# Patient Record
Sex: FEMALE | Race: BLACK OR AFRICAN AMERICAN | URBAN - METROPOLITAN AREA
[De-identification: names, ages, dates, MRNs, and addresses within clinical notes are randomized per-mention and may not be internally consistent; named-entity substitution may affect disease eponyms.]

---

## 2020-03-05 ENCOUNTER — OP HISTORICAL/CONVERTED ENCOUNTER (OUTPATIENT)
Dept: OTHER | Age: 60
End: 2020-03-05

## 2020-08-06 ENCOUNTER — ED HISTORICAL/CONVERTED ENCOUNTER (OUTPATIENT)
Dept: OTHER | Age: 60
End: 2020-08-06

## 2021-05-27 ENCOUNTER — OFFICE VISIT (OUTPATIENT)
Dept: CARDIOLOGY CLINIC | Age: 61
End: 2021-05-27
Payer: MEDICAID

## 2021-05-27 VITALS
SYSTOLIC BLOOD PRESSURE: 132 MMHG | BODY MASS INDEX: 37.73 KG/M2 | OXYGEN SATURATION: 99 % | WEIGHT: 221 LBS | HEIGHT: 64 IN | DIASTOLIC BLOOD PRESSURE: 84 MMHG | HEART RATE: 83 BPM

## 2021-05-27 DIAGNOSIS — R07.9 CHEST PAIN, UNSPECIFIED TYPE: Primary | ICD-10-CM

## 2021-05-27 DIAGNOSIS — M79.606 PAIN OF LOWER EXTREMITY, UNSPECIFIED LATERALITY: ICD-10-CM

## 2021-05-27 DIAGNOSIS — R53.83 FATIGUE, UNSPECIFIED TYPE: ICD-10-CM

## 2021-05-27 PROCEDURE — 93000 ELECTROCARDIOGRAM COMPLETE: CPT | Performed by: SPECIALIST

## 2021-05-27 PROCEDURE — 99204 OFFICE O/P NEW MOD 45 MIN: CPT | Performed by: SPECIALIST

## 2021-05-27 RX ORDER — ERGOCALCIFEROL 1.25 MG/1
CAPSULE ORAL
COMMUNITY
Start: 2021-05-05

## 2021-05-27 RX ORDER — OXCARBAZEPINE 150 MG/1
450 TABLET, FILM COATED ORAL 2 TIMES DAILY
COMMUNITY
Start: 2021-05-26

## 2021-05-27 RX ORDER — BUPROPION HYDROCHLORIDE 150 MG/1
TABLET, EXTENDED RELEASE ORAL
Qty: 60 TABLET | Refills: 2 | Status: SHIPPED | OUTPATIENT
Start: 2021-05-27 | End: 2022-08-18

## 2021-05-27 NOTE — PROGRESS NOTES
Jimmy Madison MD. [facility]              Patient: Chris Vega  : 1960      Today's Date: 2021          HISTORY OF PRESENT ILLNESS:     History of Present Illness:  She wants to get checked out. Has leg pain in the Am. Has some shoulder and neck pain that persists despite cortisone shot. Sometimes gets chest pain randomly. She has a FH of heart disease and worried about that. Smokes. Wakes up tired. PAST MEDICAL HISTORY:     Past Medical History:   Diagnosis Date    Arthritis     Family history of ischemic heart disease     Smoker     Trigeminal neuralgia            MEDICATIONS:     Current Outpatient Medications   Medication Sig Dispense Refill    OXcarbazepine (TRILEPTAL) 150 mg tablet Take 450 mg by mouth two (2) times a day.  ergocalciferol (ERGOCALCIFEROL) 1,250 mcg (50,000 unit) capsule          Allergies   Allergen Reactions    Tramadol Itching             SOCIAL HISTORY:     Social History     Tobacco Use    Smoking status: Current Every Day Smoker     Packs/day: 0.25    Smokeless tobacco: Never Used   Vaping Use    Vaping Use: Never used   Substance Use Topics    Alcohol use: Never    Drug use: Never         FAMILY HISTORY:     Family History   Problem Relation Age of Onset    Heart Disease Mother     Heart Attack Father      A      REVIEW OF SYMPTOMS:     Review of Symptoms:  Constitutional: Negative for fever, chills  HEENT: Negative for nosebleeds, tinnitus, and vision changes. Respiratory: Negative for cough, wheezing  Cardiovascular: Negative for orthopnea, claudication, syncope, and PND. Gastrointestinal: Negative for abdominal pain, diarrhea, melena. Genitourinary: Negative for dysuria  Musculoskeletal:   + joint pain  Skin: Negative for rash  Heme: No problems bleeding. Neurological: Negative for speech change and focal weakness.          PHYSICAL EXAM:     Physical Exam:  Visit Vitals  /84 (BP 1 Location: Left arm, BP Patient Position: Sitting, BP Cuff Size: Large adult)   Pulse 83   Ht 5' 4\" (1.626 m)   Wt 221 lb (100.2 kg)   SpO2 99%   BMI 37.93 kg/m²     Patient appears generally well, mood and affect are appropriate and pleasant. HEENT:  Hearing intact, non-icteric, normocephalic, atraumatic. Neck Exam: Supple, No JVD or carotid bruits. Lung Exam: Clear to auscultation, even breath sounds. Cardiac Exam: Regular rate and rhythm with no murmur or rub  Abdomen: Soft, non-tender, normal bowel sounds. No bruits or masses. Extremities: Moves all ext well. No lower extremity edema. MSKTL: Overall good ROM ext  Skin: No significant rashes  Vascular: 2+ dorsalis pedis pulses bilaterally. Psych: Appropriate affect  Neuro - Grossly intact      LABS / OTHER STUDIES reviewed:     Labs 5/4/21 - CMP OK, CBC OK. Chol 164, HDL 47, LDL 95, VIt D 15      CARDIAC DIAGNOSTICS:     Cardiac Evaluation Includes:  I reviewed the results below. EKG 5/27/21 - NSR, normal       ASSESSMENT AND PLAN:     Assessment and Plan:    1) Atypical Chest pain and FH of ischemic heart disease   - check a treadmill stress test and echo    - Needs aggressive risk reduction (such as smoking cessation) given elevated cardiac risk   - consider a CT heart scan     2) Tired   - refer for sleep study   - check TSH     3) Smoker  - she felt worse on nicotine patch   - did well on Wellbutrin before so will try that -- wrote Rx    4) Leg pain   - check LE PVR's. 5) See NP in 1 month   Has 1 daughter. Lives with mom. Mitali Roman MD, [address], [facility], [address] [place] [address]  Ph: [phone]   Ph [phone]        ADDENDUM   6/30/2021  Echo 6/29/21 - LVEF 60%    Treadmill stress test 6/29/21 -   Borderline ischemic EKG changes.  There is a 1 mm upsloping ST depression in inferolateral leads at peak exercise. During recovery she had horizontal 1 mm ST depression in the inferior leads. Stress test: Moderate risk Duke treadmill score. Exercise stress test: EKG stress test results correlate with an intermediate risk of inducible myocardial ischemia. LE PVR's with exercise -  Right:  There is evidence of significant inflow disease (I.e. aorta iliac) within the right lower extremity. JOSE LUIS's are normal at rest (1.15) and moderately reduced with exercise (0.63). Left:  There is evidence of significant inflow disease. Abnormalities are likely at the aorta iliac and femoral artery levels. ABIs are normal at rest (0.99) and severely reduced with exercise (0.50). No ischemia and normal LVEF. Has LE PAD. Needs to quit smoking. Will call. She sees NP in 1 week. ADDENDUM   7/1/2021  I called with results. Regarding borderline abnormal EKG changes on stress test, will check an exercise cardiolite. Regarding PAD, asked her to quit cigs and keep walking. Asked her to stay hydrated for resting cramps. Will move back NP appt a couple of months down the road. ADDENDUM   8/2/2021  Lexiscan Cardiolite 7/29/21 - Normal MPI. LVEF 61%    Will have nurse call with normal results. Likely no obstructive CAD.

## 2021-05-27 NOTE — PROGRESS NOTES
Identified pt with two pt identifiers(name and ). Reviewed record in preparation for visit and have obtained necessary documentation. Chief Complaint   Patient presents with    New Patient     referred by her PCP Dinorah Samson, pt has a family hx of heart problems so she wanted to get checked out. Vitals:    21 1056   BP: 132/84   Pulse: 83   SpO2: 99%   Weight: 221 lb (100.2 kg)   Height: 5' 4\" (1.626 m)   PainSc:   0 - No pain       Health Maintenance Due   Topic    Hepatitis C Screening     COVID-19 Vaccine (1)    DTaP/Tdap/Td series (1 - Tdap)    PAP AKA CERVICAL CYTOLOGY     Lipid Screen     Shingrix Vaccine Age 49> (1 of 2)    Colorectal Cancer Screening Combo     Breast Cancer Screen Mammogram        Coordination of Care Questionnaire:  :   1) Have you been to an emergency room, urgent care, or hospitalized since your last visit? If yes, where when, and reason for visit? No      2. Have seen or consulted any other health care provider since your last visit? If yes, where when, and reason for visit?   No

## 2021-05-27 NOTE — PROGRESS NOTES
Orders for Check echo, treadmill stress test, LE PVR's when possible; sleep study ref. See Carlos Romero in a month per Dr. Kanika TORIBIO.   Dx: cp, fh heart dz

## 2021-06-06 LAB — TSH SERPL DL<=0.005 MIU/L-ACNC: 1.03 UIU/ML (ref 0.45–4.5)

## 2021-06-29 ENCOUNTER — ANCILLARY PROCEDURE (OUTPATIENT)
Dept: CARDIOLOGY CLINIC | Age: 61
End: 2021-06-29

## 2021-06-29 ENCOUNTER — ANCILLARY PROCEDURE (OUTPATIENT)
Dept: CARDIOLOGY CLINIC | Age: 61
End: 2021-06-29
Payer: MEDICAID

## 2021-06-29 VITALS
HEIGHT: 64 IN | BODY MASS INDEX: 37.73 KG/M2 | WEIGHT: 221 LBS | SYSTOLIC BLOOD PRESSURE: 130 MMHG | DIASTOLIC BLOOD PRESSURE: 78 MMHG

## 2021-06-29 VITALS — HEIGHT: 64 IN | BODY MASS INDEX: 37.73 KG/M2 | WEIGHT: 221 LBS

## 2021-06-29 VITALS — BODY MASS INDEX: 37.73 KG/M2 | HEIGHT: 64 IN | WEIGHT: 221 LBS

## 2021-06-29 DIAGNOSIS — R07.9 CHEST PAIN, UNSPECIFIED TYPE: ICD-10-CM

## 2021-06-29 DIAGNOSIS — I73.9 CLAUDICATION IN PERIPHERAL VASCULAR DISEASE (HCC): ICD-10-CM

## 2021-06-29 DIAGNOSIS — R53.83 FATIGUE, UNSPECIFIED TYPE: ICD-10-CM

## 2021-06-29 DIAGNOSIS — M79.606 PAIN OF LOWER EXTREMITY, UNSPECIFIED LATERALITY: ICD-10-CM

## 2021-06-29 LAB
ECHO AO ROOT DIAM: 2.95 CM
ECHO AV AREA PEAK VELOCITY: 1.88 CM2
ECHO AV AREA VTI: 2.04 CM2
ECHO AV AREA/BSA PEAK VELOCITY: 0.9 CM2/M2
ECHO AV AREA/BSA VTI: 1 CM2/M2
ECHO AV MEAN GRADIENT: 3.57 MMHG
ECHO AV PEAK GRADIENT: 6.5 MMHG
ECHO AV PEAK VELOCITY: 127.49 CM/S
ECHO AV VTI: 26.99 CM
ECHO LA AREA 4C: 15.24 CM2
ECHO LA MAJOR AXIS: 3.45 CM
ECHO LA MINOR AXIS: 1.69 CM
ECHO LA VOL 2C: 49.49 ML (ref 22–52)
ECHO LA VOL 4C: 36.22 ML (ref 22–52)
ECHO LA VOL BP: 47.64 ML (ref 22–52)
ECHO LA VOL/BSA BIPLANE: 23.35 ML/M2 (ref 16–28)
ECHO LA VOLUME INDEX A2C: 24.26 ML/M2 (ref 16–28)
ECHO LA VOLUME INDEX A4C: 17.75 ML/M2 (ref 16–28)
ECHO LV E' LATERAL VELOCITY: 7.37 CM/S
ECHO LV E' SEPTAL VELOCITY: 5.65 CM/S
ECHO LV EDV A2C: 94.89 ML
ECHO LV EDV A4C: 123.99 ML
ECHO LV EDV BP: 108.49 ML (ref 56–104)
ECHO LV EDV INDEX A4C: 60.8 ML/M2
ECHO LV EDV INDEX BP: 53.2 ML/M2
ECHO LV EDV NDEX A2C: 46.5 ML/M2
ECHO LV EJECTION FRACTION A2C: 61 PERCENT
ECHO LV EJECTION FRACTION A4C: 67 PERCENT
ECHO LV EJECTION FRACTION BIPLANE: 62.2 PERCENT (ref 55–100)
ECHO LV ESV A2C: 36.99 ML
ECHO LV ESV A4C: 40.63 ML
ECHO LV ESV BP: 40.96 ML (ref 19–49)
ECHO LV ESV INDEX A2C: 18.1 ML/M2
ECHO LV ESV INDEX A4C: 19.9 ML/M2
ECHO LV ESV INDEX BP: 20.1 ML/M2
ECHO LV INTERNAL DIMENSION DIASTOLIC: 4.68 CM (ref 3.9–5.3)
ECHO LV INTERNAL DIMENSION SYSTOLIC: 3.28 CM
ECHO LV IVSD: 0.91 CM (ref 0.6–0.9)
ECHO LV MASS 2D: 138.6 G (ref 67–162)
ECHO LV MASS INDEX 2D: 67.9 G/M2 (ref 43–95)
ECHO LV POSTERIOR WALL DIASTOLIC: 0.86 CM (ref 0.6–0.9)
ECHO LVOT DIAM: 1.93 CM
ECHO LVOT PEAK GRADIENT: 2.66 MMHG
ECHO LVOT PEAK VELOCITY: 81.48 CM/S
ECHO LVOT SV: 55 ML
ECHO LVOT VTI: 18.73 CM
ECHO MV A VELOCITY: 102.12 CM/S
ECHO MV AREA PHT: 5.18 CM2
ECHO MV E DECELERATION TIME (DT): 146.46 MS
ECHO MV E VELOCITY: 53.54 CM/S
ECHO MV E/A RATIO: 0.52
ECHO MV E/E' LATERAL: 7.26
ECHO MV E/E' RATIO (AVERAGED): 8.37
ECHO MV E/E' SEPTAL: 9.48
ECHO MV PRESSURE HALF TIME (PHT): 42.47 MS
ECHO RA AREA 4C: 11.15 CM2
ECHO RV INTERNAL DIMENSION: 3.17 CM
ECHO RV TAPSE: 1.96 CM (ref 1.5–2)
ECHO TV REGURGITANT MAX VELOCITY: 257.57 CM/S
ECHO TV REGURGITANT PEAK GRADIENT: 26.54 MMHG
IMMEDIATE ARM BP: 149 MMHG
IMMEDIATE LEFT ABI: 0.5
IMMEDIATE LEFT TIBIAL: 75 MMHG
IMMEDIATE RIGHT ABI: 0.63
IMMEDIATE RIGHT TIBIAL: 94 MMHG
LA VOL DISK BP: 42.92 ML (ref 22–52)
LEFT ABI: 0.99
LEFT ANTERIOR TIBIAL: 153 MMHG
LEFT ARM BP: 155 MMHG
LEFT CALF PRESSURE: 148 MMHG
LEFT HIGH THIGH PRESSURE: 229 MMHG
LEFT LOW THIGH PRESSURE: 195 MMHG
LEFT POSTERIOR TIBIAL: 153 MMHG
POST1 ARM BP: 145 MMHG
POST1 LEFT ABI: 0.59
POST1 LEFT TIBIAL: 85 MMHG
POST1 RIGHT ABI: 1.05
POST1 RIGHT TIBIAL: 152 MMHG
RIGHT ABI: 1.15
RIGHT ANTERIOR TIBIAL: 167 MMHG
RIGHT ARM BP: 148 MMHG
RIGHT CALF PRESSURE: 182 MMHG
RIGHT HIGH THIGH PRESSURE: 214 MMHG
RIGHT LOW THIGH PRESSURE: 209 MMHG
RIGHT POSTERIOR TIBIAL: 179 MMHG
STRESS ANGINA INDEX: 0
STRESS BASELINE DIAS BP: 86 MMHG
STRESS BASELINE HR: 80 BPM
STRESS BASELINE SYS BP: 134 MMHG
STRESS ESTIMATED WORKLOAD: 7 METS
STRESS EXERCISE DUR MIN: NORMAL
STRESS O2 SAT PEAK: 99 %
STRESS O2 SAT REST: 100 %
STRESS PEAK DIAS BP: 96 MMHG
STRESS PEAK SYS BP: 200 MMHG
STRESS PERCENT HR ACHIEVED: 94 %
STRESS POST PEAK HR: 150 BPM
STRESS RATE PRESSURE PRODUCT: NORMAL BPM*MMHG
STRESS ST DEPRESSION: 1 MM
STRESS TARGET HR: 159 BPM

## 2021-06-29 PROCEDURE — 93306 TTE W/DOPPLER COMPLETE: CPT | Performed by: SPECIALIST

## 2021-06-29 PROCEDURE — 93924 LWR XTR VASC STDY BILAT: CPT | Performed by: SPECIALIST

## 2021-06-29 PROCEDURE — 93015 CV STRESS TEST SUPVJ I&R: CPT | Performed by: SPECIALIST

## 2021-07-02 ENCOUNTER — TELEPHONE (OUTPATIENT)
Dept: CARDIOLOGY CLINIC | Age: 61
End: 2021-07-02

## 2021-07-02 DIAGNOSIS — R07.9 CHEST PAIN, UNSPECIFIED TYPE: Primary | ICD-10-CM

## 2021-07-02 DIAGNOSIS — R53.83 FATIGUE, UNSPECIFIED TYPE: ICD-10-CM

## 2021-07-02 NOTE — TELEPHONE ENCOUNTER
Called pt, LVM  Per Dr. Kerri Hermosillo: Marlenniharika Lopez - can you please   1) set her up for an exercise cardiolite   2) Cancel NP visit for next month and instead have her come back in 2 months for FU visit     Cathi Pollock - can you please call her with exercise cardiolite instructions. \"    Unable to speak with pt, so I left the OV on. Have the instructions on JOSÉ LUIS Rutherford's desk to give pt Tue 9am, schedule before leaving office.

## 2021-07-29 ENCOUNTER — ANCILLARY PROCEDURE (OUTPATIENT)
Dept: CARDIOLOGY CLINIC | Age: 61
End: 2021-07-29
Payer: MEDICAID

## 2021-07-29 VITALS — HEIGHT: 64 IN | WEIGHT: 221 LBS | BODY MASS INDEX: 37.73 KG/M2

## 2021-07-29 DIAGNOSIS — R53.83 FATIGUE, UNSPECIFIED TYPE: ICD-10-CM

## 2021-07-29 DIAGNOSIS — R07.9 CHEST PAIN, UNSPECIFIED TYPE: ICD-10-CM

## 2021-07-29 PROCEDURE — 93015 CV STRESS TEST SUPVJ I&R: CPT | Performed by: SPECIALIST

## 2021-07-29 PROCEDURE — 78452 HT MUSCLE IMAGE SPECT MULT: CPT | Performed by: SPECIALIST

## 2021-07-29 PROCEDURE — A9500 TC99M SESTAMIBI: HCPCS | Performed by: SPECIALIST

## 2021-07-29 RX ORDER — TETRAKIS(2-METHOXYISOBUTYLISOCYANIDE)COPPER(I) TETRAFLUOROBORATE 1 MG/ML
25.6 INJECTION, POWDER, LYOPHILIZED, FOR SOLUTION INTRAVENOUS ONCE
Status: COMPLETED | OUTPATIENT
Start: 2021-07-29 | End: 2021-07-29

## 2021-07-29 RX ORDER — TETRAKIS(2-METHOXYISOBUTYLISOCYANIDE)COPPER(I) TETRAFLUOROBORATE 1 MG/ML
8.4 INJECTION, POWDER, LYOPHILIZED, FOR SOLUTION INTRAVENOUS ONCE
Status: COMPLETED | OUTPATIENT
Start: 2021-07-29 | End: 2021-07-29

## 2021-07-29 RX ADMIN — TETRAKIS(2-METHOXYISOBUTYLISOCYANIDE)COPPER(I) TETRAFLUOROBORATE 25.6 MILLICURIE: 1 INJECTION, POWDER, LYOPHILIZED, FOR SOLUTION INTRAVENOUS at 11:42

## 2021-07-29 RX ADMIN — TETRAKIS(2-METHOXYISOBUTYLISOCYANIDE)COPPER(I) TETRAFLUOROBORATE 8.4 MILLICURIE: 1 INJECTION, POWDER, LYOPHILIZED, FOR SOLUTION INTRAVENOUS at 10:20

## 2021-07-31 LAB
STRESS BASELINE DIAS BP: 78 MMHG
STRESS BASELINE HR: 71 BPM
STRESS BASELINE SYS BP: 142 MMHG
STRESS ESTIMATED WORKLOAD: 1 METS
STRESS EXERCISE DUR MIN: NORMAL
STRESS O2 SAT PEAK: 100 %
STRESS O2 SAT REST: 99 %
STRESS PEAK DIAS BP: 78 MMHG
STRESS PEAK SYS BP: 140 MMHG
STRESS PERCENT HR ACHIEVED: 71 %
STRESS POST PEAK HR: 113 BPM
STRESS RATE PRESSURE PRODUCT: NORMAL BPM*MMHG
STRESS ST DEPRESSION: 0 MM
STRESS ST ELEVATION: 0 MM
STRESS TARGET HR: 159 BPM

## 2021-08-02 ENCOUNTER — TELEPHONE (OUTPATIENT)
Dept: CARDIOLOGY CLINIC | Age: 61
End: 2021-08-02

## 2021-08-02 NOTE — TELEPHONE ENCOUNTER
Called pt,  Per Dr. Dionna Nguyễn: Davin Simpson 7/29/21 - Normal MPI.  LVEF 61%     Will have nurse call with normal results. Likely no obstructive CAD. \"  Confirmed location & timing NOV:  Future Appointments   Date Time Provider Marissa Petty   9/15/2021  1:20 PM MD MARIELA Garcia AMB

## 2022-05-11 ENCOUNTER — OFFICE VISIT (OUTPATIENT)
Dept: PODIATRY | Age: 62
End: 2022-05-11
Payer: MEDICAID

## 2022-05-11 VITALS
BODY MASS INDEX: 37.73 KG/M2 | TEMPERATURE: 96.8 F | HEART RATE: 70 BPM | HEIGHT: 64 IN | DIASTOLIC BLOOD PRESSURE: 66 MMHG | SYSTOLIC BLOOD PRESSURE: 130 MMHG | WEIGHT: 221 LBS

## 2022-05-11 DIAGNOSIS — L60.0 INGROWN TOENAIL OF RIGHT FOOT: Primary | ICD-10-CM

## 2022-05-11 DIAGNOSIS — L60.0 INGROWN TOENAIL OF LEFT FOOT: ICD-10-CM

## 2022-05-11 PROCEDURE — 99203 OFFICE O/P NEW LOW 30 MIN: CPT | Performed by: PODIATRIST

## 2022-05-11 RX ORDER — LANOLIN ALCOHOL/MO/W.PET/CERES
1000 CREAM (GRAM) TOPICAL DAILY
COMMUNITY

## 2022-05-11 RX ORDER — LOSARTAN POTASSIUM AND HYDROCHLOROTHIAZIDE 12.5; 1 MG/1; MG/1
1 TABLET ORAL DAILY
COMMUNITY

## 2022-05-11 RX ORDER — CELECOXIB 200 MG/1
200 CAPSULE ORAL 2 TIMES DAILY
COMMUNITY

## 2022-05-11 NOTE — PROGRESS NOTES
Chief Complaint   Patient presents with    Ingrown Toenail     pt states both great toes are sore     1. Have you been to the ER, urgent care clinic since your last visit? Hospitalized since your last visit? No    2. Have you seen or consulted any other health care providers outside of the [address] [place] [address] since your last visit? Include any pap smears or colon screening.  No  PCP-Dr Richard Peterson

## 2022-06-07 NOTE — PROGRESS NOTES
[facility]    Subjective:         Patient is a 58 y.o. female who is being seen as a new pt for bilateral big toe pain. Patient states she has been suffering with the pain for the past several weeks with increasing severity. States pain now is level of 8 out of 10. She states weightbearing and pressure exacerbates pain. She denies any overt trauma. She denies any recent changes in her shoe gear but admits to a decrease in activity due to the pain. She denies any overt local/systemic signs infection. She denies any other pedal complaints    Past Medical History:   Diagnosis Date    Arthritis     Family history of ischemic heart disease     Smoker     Trigeminal neuralgia      Past Surgical History:   Procedure Laterality Date    HX GASTRIC BYPASS  1991    HX HYSTERECTOMY  2001       Family History   Problem Relation Age of Onset    Heart Disease Mother     Heart Attack Father       Social History     Tobacco Use    Smoking status: Current Every Day Smoker     Packs/day: 0.25    Smokeless tobacco: Never Used   Substance Use Topics    Alcohol use: Never     Allergies   Allergen Reactions    Tramadol Itching     Prior to Admission medications    Medication Sig Start Date End Date Taking? Authorizing Provider   celecoxib (CeleBREX) 200 mg capsule Take 200 mg by mouth two (2) times a day. Yes Provider, Historical   cyanocobalamin 1,000 mcg tablet Take 1,000 mcg by mouth daily. Yes Provider, Historical   losartan-hydroCHLOROthiazide (HYZAAR) 100-12.5 mg per tablet Take 1 Tablet by mouth daily. Yes Provider, Historical   OXcarbazepine (TRILEPTAL) 150 mg tablet Take 450 mg by mouth two (2) times a day.  5/26/21  Yes Provider, Historical   ergocalciferol (ERGOCALCIFEROL) 1,250 mcg (50,000 unit) capsule  5/5/21   Provider, Historical   buPROPion SR (WELLBUTRIN SR) 150 mg SR tablet Oral: Initial: 150 mg once daily for 3 days; increase to 150 mg twice daily (maximum dose: 300 mg/day). Patient not taking: Reported on 5/11/2022 5/27/21   Kusum Neff MD       Review of Systems   Constitutional: Negative. HENT: Negative. Eyes: Negative. Respiratory: Negative. Cardiovascular: Negative. Gastrointestinal: Negative. Endocrine: Negative. Genitourinary: Negative. Musculoskeletal: Positive for arthralgias. Skin: Negative. Allergic/Immunologic: Negative. Neurological: Negative. Hematological: Negative. Psychiatric/Behavioral: Positive for dysphoric mood. All other systems reviewed and are negative. Objective:     Visit Vitals  /66 (BP 1 Location: Right upper arm, BP Patient Position: Sitting, BP Cuff Size: Large adult)   Pulse 70   Temp 96.8 °F (36 °C) (Temporal)   Ht 5' 4\" (1.626 m)   Wt 221 lb (100.2 kg)   BMI 37.93 kg/m²       Physical Exam  Vitals reviewed. Constitutional:       Appearance: She is morbidly obese. Cardiovascular:      Pulses:           Dorsalis pedis pulses are 2+ on the right side and 2+ on the left side. Posterior tibial pulses are 2+ on the right side and 2+ on the left side. Pulmonary:      Effort: Pulmonary effort is normal.   Musculoskeletal:      Right lower leg: No edema. Left lower leg: No edema. Right foot: Normal range of motion. No deformity or bunion. Left foot: Normal range of motion. No deformity or bunion. Feet:      Right foot:      Protective Sensation: 10 sites tested. 10 sites sensed. Skin integrity: Skin integrity normal.      Toenail Condition: Right toenails are ingrown. Left foot:      Protective Sensation: 10 sites tested. 10 sites sensed. Skin integrity: Skin integrity normal.      Toenail Condition: Left toenails are ingrown. Lymphadenopathy:      Lower Body: No right inguinal adenopathy. No left inguinal adenopathy. Skin:     General: Skin is warm. Capillary Refill: Capillary refill takes 2 to 3 seconds.    Neurological:      Mental Status: She is alert and oriented to person, place, and time. Psychiatric:         Mood and Affect: Mood and affect normal.         Behavior: Behavior is cooperative. Data Review: No results found for this or any previous visit (from the past 24 hour(s)). Impression:       ICD-10-CM ICD-9-CM    1. Ingrown toenail of right foot  L60.0 703.0    2. Ingrown toenail of left foot  L60.0 703.0        Recommendation:     Patient seen and evaluated in the office  Discussed and educated patient regarding her current medical condition and possible etiologies of her symptoms  A slant back procedure was performed the offending nail borders of both great toenails. Patient tolerated well. Instructed patient that if her symptoms persist, she may need a more permanent procedure. Patient verbalized understanding        William Horta.  Balwinder Sullivan, [facility], [address] [place] [address] and [facility]  [address] [place] [address]  O: [phone]  F: [phone]  C: [phone]

## 2022-08-18 ENCOUNTER — OFFICE VISIT (OUTPATIENT)
Dept: PODIATRY | Age: 62
End: 2022-08-18
Payer: MEDICAID

## 2022-08-18 VITALS
BODY MASS INDEX: 37.73 KG/M2 | DIASTOLIC BLOOD PRESSURE: 73 MMHG | WEIGHT: 221 LBS | HEART RATE: 72 BPM | TEMPERATURE: 96.8 F | HEIGHT: 64 IN | SYSTOLIC BLOOD PRESSURE: 142 MMHG

## 2022-08-18 DIAGNOSIS — G57.93 NEUROPATHY INVOLVING BOTH LOWER EXTREMITIES: ICD-10-CM

## 2022-08-18 DIAGNOSIS — L60.3 ONYCHODYSTROPHY: Primary | ICD-10-CM

## 2022-08-18 PROCEDURE — 99213 OFFICE O/P EST LOW 20 MIN: CPT | Performed by: PODIATRIST

## 2022-08-18 RX ORDER — AMITRIPTYLINE HYDROCHLORIDE 25 MG/1
25 TABLET, FILM COATED ORAL
Qty: 90 TABLET | Refills: 0 | Status: SHIPPED | OUTPATIENT
Start: 2022-08-18

## 2022-08-18 NOTE — PROGRESS NOTES
Chief Complaint   Patient presents with    Foot Exam     Pt states she needs her toenails clipped     1. Have you been to the ER, urgent care clinic since your last visit? Hospitalized since your last visit? No    2. Have you seen or consulted any other health care providers outside of the [address] [place] [address] since your last visit? Include any pap smears or colon screening.  No  PCP-Dr Wilson Person

## 2022-09-16 NOTE — PROGRESS NOTES
[facility]    Subjective:         Patient is a 58 y.o. female who is being seen as a returning patient for bilateral foot pain. Patient states that she also has thick/discolored toenails. She states the foot pain is worse in the evenings and is described as a burning/tingling. She denies any trauma. She denies any recent changes in her shoe gear or activity level. She denies any breaks in skin or systemic signs of infection. Regarding the toenails, she states she is tried multiple over-the-counter medications without relief of her symptoms. She denies any other lower extremity complaints      Past Medical History:   Diagnosis Date    Arthritis     Family history of ischemic heart disease     Smoker     Trigeminal neuralgia      Past Surgical History:   Procedure Laterality Date    HX GASTRIC BYPASS  1991    HX HYSTERECTOMY  2001       Family History   Problem Relation Age of Onset    Heart Disease Mother     Heart Attack Father       Social History     Tobacco Use    Smoking status: Every Day     Packs/day: 0.25     Types: Cigarettes    Smokeless tobacco: Never   Substance Use Topics    Alcohol use: Never     Allergies   Allergen Reactions    Tramadol Itching     Prior to Admission medications    Medication Sig Start Date End Date Taking? Authorizing Provider   amitriptyline (ELAVIL) 25 mg tablet Take 1 Tablet by mouth nightly. 8/18/22  Yes Leighton Young DPM   celecoxib (CeleBREX) 200 mg capsule Take 200 mg by mouth two (2) times a day. Provider, Historical   cyanocobalamin 1,000 mcg tablet Take 1,000 mcg by mouth daily. Provider, Historical   losartan-hydroCHLOROthiazide (HYZAAR) 100-12.5 mg per tablet Take 1 Tablet by mouth daily. Provider, Historical   OXcarbazepine (TRILEPTAL) 150 mg tablet Take 450 mg by mouth two (2) times a day.  5/26/21   Provider, Historical   ergocalciferol (ERGOCALCIFEROL) 1,250 mcg (50,000 unit) capsule  5/5/21   Provider, Historical Review of Systems   Constitutional: Negative. HENT: Negative. Eyes: Negative. Respiratory: Negative. Cardiovascular: Negative. Gastrointestinal: Negative. Endocrine: Negative. Genitourinary: Negative. Musculoskeletal:  Positive for arthralgias. Skin: Negative. Allergic/Immunologic: Negative. Neurological: Negative. Hematological: Negative. Psychiatric/Behavioral:  Positive for dysphoric mood. All other systems reviewed and are negative. Objective:     Visit Vitals  BP (!) 142/73 (BP 1 Location: Right upper arm, BP Patient Position: Sitting, BP Cuff Size: Large adult)   Pulse 72   Temp 96.8 °F (36 °C) (Temporal)   Ht 5' 4\" (1.626 m)   Wt 221 lb (100.2 kg)   BMI 37.93 kg/m²       Physical Exam  Vitals reviewed. Constitutional:       Appearance: She is morbidly obese. Cardiovascular:      Pulses:           Dorsalis pedis pulses are 2+ on the right side and 2+ on the left side. Posterior tibial pulses are 2+ on the right side and 2+ on the left side. Pulmonary:      Effort: Pulmonary effort is normal.   Musculoskeletal:      Right lower leg: No edema. Left lower leg: No edema. Right foot: Normal range of motion. No deformity or bunion. Left foot: Normal range of motion. No deformity or bunion. Feet:      Right foot:      Protective Sensation: 10 sites tested. 10 sites sensed. Skin integrity: Skin integrity normal.      Toenail Condition: Right toenails are abnormally thick. Left foot:      Protective Sensation: 10 sites tested. 10 sites sensed. Skin integrity: Skin integrity normal.      Toenail Condition: Left toenails are abnormally thick. Lymphadenopathy:      Lower Body: No right inguinal adenopathy. No left inguinal adenopathy. Skin:     General: Skin is warm. Capillary Refill: Capillary refill takes 2 to 3 seconds. Neurological:      Mental Status: She is alert and oriented to person, place, and time. Comments: Paresthesias/burning noted   Psychiatric:         Mood and Affect: Mood and affect normal.         Behavior: Behavior is cooperative. Data Review: No results found for this or any previous visit (from the past 24 hour(s)). Impression:       ICD-10-CM ICD-9-CM    1. Onychodystrophy  L60.3 703.8       2. Neuropathy involving both lower extremities  G57.93 356.9           Recommendation:     Patient seen and evaluated in the office  Discussed and educated patient regarding her current medical condition and possible etiologies of her symptoms  Encouraged patient to thin her toenails as needed for symptomatic relief. If needed, a topical emollient can be prescribed  Discussed her lower extremity neuropathic pain. A prescription was given for amitriptyline 25 mg to be taken nightly for symptomatic relief        Jr Demarco, [address] [facility], [address] [place] [address] and [facility]  [address] [place] [address]  O: [phone]  F: [phone]  C: [phone]

## 2022-11-21 ENCOUNTER — OFFICE VISIT (OUTPATIENT)
Dept: PODIATRY | Age: 62
End: 2022-11-21
Payer: MEDICAID

## 2022-11-21 VITALS
WEIGHT: 221 LBS | SYSTOLIC BLOOD PRESSURE: 143 MMHG | BODY MASS INDEX: 37.73 KG/M2 | HEIGHT: 64 IN | DIASTOLIC BLOOD PRESSURE: 70 MMHG | TEMPERATURE: 96.9 F | HEART RATE: 73 BPM

## 2022-11-21 DIAGNOSIS — G62.9 NEUROPATHY: ICD-10-CM

## 2022-11-21 DIAGNOSIS — I73.9 PAD (PERIPHERAL ARTERY DISEASE) (HCC): Primary | ICD-10-CM

## 2022-11-21 PROCEDURE — 99213 OFFICE O/P EST LOW 20 MIN: CPT | Performed by: PODIATRIST

## 2022-11-22 NOTE — PROGRESS NOTES
[facility]    Subjective:         Patient is a 58 y.o. female who is being seen as a returning patient for bilateral foot pain. She states the foot pain is worse in the evenings and is described as a burning/tingling. She states the amitriptyline 25mg PO QHS that was rx'ed last office visit made her feel funny, thus she stopped the medication. She denies any trauma. She denies any recent changes in her shoe gear or activity level. She denies any breaks in skin or systemic signs of infection. She denies any other lower extremity complaints      Past Medical History:   Diagnosis Date    Arthritis     Family history of ischemic heart disease     Smoker     Trigeminal neuralgia      Past Surgical History:   Procedure Laterality Date    HX GASTRIC BYPASS  1991    HX HYSTERECTOMY  2001       Family History   Problem Relation Age of Onset    Heart Disease Mother     Heart Attack Father       Social History     Tobacco Use    Smoking status: Every Day     Packs/day: 0.25     Types: Cigarettes    Smokeless tobacco: Never   Substance Use Topics    Alcohol use: Never     Allergies   Allergen Reactions    Tramadol Itching     Prior to Admission medications    Medication Sig Start Date End Date Taking? Authorizing Provider   amitriptyline (ELAVIL) 25 mg tablet Take 1 Tablet by mouth nightly. 8/18/22   Yesi Young, EUSEBIO   celecoxib (CeleBREX) 200 mg capsule Take 200 mg by mouth two (2) times a day. Provider, Historical   cyanocobalamin 1,000 mcg tablet Take 1,000 mcg by mouth daily. Provider, Historical   losartan-hydroCHLOROthiazide (HYZAAR) 100-12.5 mg per tablet Take 1 Tablet by mouth daily. Provider, Historical   OXcarbazepine (TRILEPTAL) 150 mg tablet Take 450 mg by mouth two (2) times a day. 5/26/21   Provider, Historical   ergocalciferol (ERGOCALCIFEROL) 1,250 mcg (50,000 unit) capsule  5/5/21   Provider, Historical       Review of Systems   Constitutional: Negative.     HENT: Negative. Eyes: Negative. Respiratory: Negative. Cardiovascular: Negative. Gastrointestinal: Negative. Endocrine: Negative. Genitourinary: Negative. Musculoskeletal:  Positive for arthralgias. Skin: Negative. Allergic/Immunologic: Negative. Neurological: Negative. Hematological: Negative. Psychiatric/Behavioral:  Positive for dysphoric mood. All other systems reviewed and are negative. Objective:     Visit Vitals  BP (!) 143/70 (BP 1 Location: Right upper arm, BP Patient Position: Sitting, BP Cuff Size: Large adult)   Pulse 73   Temp 96.9 °F (36.1 °C) (Temporal)   Ht 5' 4\" (1.626 m)   Wt 221 lb (100.2 kg)   BMI 37.93 kg/m²       Physical Exam  Vitals reviewed. Constitutional:       Appearance: She is morbidly obese. Cardiovascular:      Pulses:           Dorsalis pedis pulses are 2+ on the right side and 2+ on the left side. Posterior tibial pulses are 2+ on the right side and 2+ on the left side. Pulmonary:      Effort: Pulmonary effort is normal.   Musculoskeletal:      Right lower leg: No edema. Left lower leg: No edema. Right foot: Normal range of motion. No deformity or bunion. Left foot: Normal range of motion. No deformity or bunion. Feet:      Right foot:      Protective Sensation: 10 sites tested. 10 sites sensed. Skin integrity: Skin integrity normal.      Toenail Condition: Right toenails are abnormally thick. Left foot:      Protective Sensation: 10 sites tested. 10 sites sensed. Skin integrity: Skin integrity normal.      Toenail Condition: Left toenails are abnormally thick. Lymphadenopathy:      Lower Body: No right inguinal adenopathy. No left inguinal adenopathy. Skin:     General: Skin is warm. Capillary Refill: Capillary refill takes 2 to 3 seconds. Neurological:      Mental Status: She is alert and oriented to person, place, and time.       Comments: Paresthesias/burning noted   Psychiatric: Mood and Affect: Mood and affect normal.         Behavior: Behavior is cooperative. Data Review: No results found for this or any previous visit (from the past 24 hour(s)). Impression:       ICD-10-CM ICD-9-CM    1. PAD (peripheral artery disease) (HCC)  I73.9 443.9 LOWER EXT ART PVR MULT LEVEL SEG PRESSURES      2. Neuropathy  G62.9 355.9           Recommendation:     Patient seen and evaluated in the office  Discussed and educated patient regarding her current medical condition and possible etiologies of her symptoms  Rx given for non-invasive vascular studies to irrigate the arterial inflow of bilateral lower extremities. Once imaging has been performed, will discuss treatment options in more depth          Jr Rosales, [address] [facility], [facility] and [facility]  [address] [place] [address]  O: [phone]  F: [phone]  C: [phone]

## 2022-11-23 ENCOUNTER — HOSPITAL ENCOUNTER (OUTPATIENT)
Dept: NON INVASIVE DIAGNOSTICS | Age: 62
Discharge: HOME | End: 2022-11-23
Attending: PODIATRIST
Payer: MEDICAID

## 2022-11-23 DIAGNOSIS — I73.9 PAD (PERIPHERAL ARTERY DISEASE) (HCC): ICD-10-CM

## 2022-11-23 PROCEDURE — 93923 UPR/LXTR ART STDY 3+ LVLS: CPT

## 2022-11-24 LAB
LEFT ABI: 0.88
LEFT ARM BP: 140 MMHG
LEFT POSTERIOR TIBIAL: 130 MMHG
LEFT TBI: 0.62
LEFT TOE PRESSURE: 92 MMHG
RIGHT ABI: 0.98
RIGHT ARM BP: 148 MMHG
RIGHT POSTERIOR TIBIAL: 143 MMHG
RIGHT TBI: 0.66
RIGHT TOE PRESSURE: 98 MMHG
VAS LEFT DORSALIS PEDIS BP: 115 MMHG
VAS RIGHT DORSALIS PEDIS BP: 145 MMHG

## 2022-11-29 DIAGNOSIS — I73.9 PAD (PERIPHERAL ARTERY DISEASE) (HCC): Primary | ICD-10-CM

## 2022-12-19 ENCOUNTER — OFFICE VISIT (OUTPATIENT)
Dept: SURGERY | Age: 62
End: 2022-12-19
Payer: MEDICAID

## 2022-12-19 VITALS
DIASTOLIC BLOOD PRESSURE: 67 MMHG | BODY MASS INDEX: 35 KG/M2 | SYSTOLIC BLOOD PRESSURE: 134 MMHG | TEMPERATURE: 97.1 F | OXYGEN SATURATION: 93 % | WEIGHT: 205 LBS | HEART RATE: 77 BPM | HEIGHT: 64 IN

## 2022-12-19 DIAGNOSIS — I73.9 PERIPHERAL VASCULAR DISEASE (HCC): Primary | ICD-10-CM

## 2022-12-19 PROCEDURE — 99203 OFFICE O/P NEW LOW 30 MIN: CPT | Performed by: SURGERY

## 2022-12-19 RX ORDER — DICLOFENAC SODIUM 75 MG/1
TABLET, DELAYED RELEASE ORAL
COMMUNITY
Start: 2022-11-18

## 2022-12-19 RX ORDER — HYDROCHLOROTHIAZIDE 25 MG/1
25 TABLET ORAL DAILY
COMMUNITY
Start: 2022-11-18

## 2022-12-19 NOTE — PROGRESS NOTES
Identified pt with two pt identifiers (name and ). Reviewed chart in preparation for visit and have obtained necessary documentation. Leora Barrios is a 58 y.o. female  Chief Complaint   Patient presents with    New Patient     Consultation-PAD     Visit Vitals  /67 (BP 1 Location: Left upper arm, BP Patient Position: Sitting, BP Cuff Size: Large adult)   Pulse 77   Temp 97.1 °F (36.2 °C) (Temporal)   Ht 5' 4\" (1.626 m)   Wt 205 lb (93 kg)   SpO2 93%   BMI 35.19 kg/m²       1. Have you been to the ER, urgent care clinic since your last visit? Hospitalized since your last visit? No    2. Have you seen or consulted any other health care providers outside of the [address] [place] [address] since your last visit? Include any pap smears or colon screening.  No

## 2022-12-27 PROBLEM — I73.9 PERIPHERAL VASCULAR DISEASE (HCC): Status: ACTIVE | Noted: 2022-12-27

## 2022-12-27 NOTE — PROGRESS NOTES
Vascular History and Physical    Patient: Ghada Hoang  MRN: [id number]    YOB: 1960  Age: 58 y.o. Sex: female     Chief Complaint:  Chief Complaint   Patient presents with    New Patient     Consultation-PAD       History of Present Illness: Ghada Hoang is a 58 y.o. very pleasant woman is here today for vascular evaluation. Patient has had both leg numbness and pain for some time now. Patient was told she has PAD. And has failed conservative management for PAD. Recent JOSE LUIS examination shows right ankle index 0.98 and TBI 0.6, on the left side 0.98 and TBI 0.6. Patient's symptoms are more constant now. Patient denies any chest pain, recent MI or stroke. Denies abdominal pain or weight loss. Social History:  Social Connections: Not on file       Past Medical History:  Past Medical History:   Diagnosis Date    Arthritis     Family history of ischemic heart disease     Smoker     Trigeminal neuralgia        Surgical History:  Past Surgical History:   Procedure Laterality Date    HX GASTRIC BYPASS  1991    HX HYSTERECTOMY  2001       Allergies: Allergies   Allergen Reactions    Tramadol Itching       Current Meds:  Current Outpatient Medications   Medication Sig Dispense Refill    hydroCHLOROthiazide (HYDRODIURIL) 25 mg tablet Take 25 mg by mouth daily. diclofenac EC (VOLTAREN) 75 mg EC tablet TAKE 1 TABLET 2 TIMES A DAY WITH FOOD      cyanocobalamin 1,000 mcg tablet Take 1,000 mcg by mouth daily. OXcarbazepine (TRILEPTAL) 150 mg tablet Take 450 mg by mouth two (2) times a day. amitriptyline (ELAVIL) 25 mg tablet Take 1 Tablet by mouth nightly. (Patient not taking: Reported on 12/19/2022) 90 Tablet 0    celecoxib (CELEBREX) 200 mg capsule Take 200 mg by mouth two (2) times a day. (Patient not taking: Reported on 12/19/2022)      losartan-hydroCHLOROthiazide (HYZAAR) 100-12.5 mg per tablet Take 1 Tablet by mouth daily.  (Patient not taking: Reported on 12/19/2022) ergocalciferol (ERGOCALCIFEROL) 1,250 mcg (50,000 unit) capsule  (Patient not taking: No sig reported)         Review of Systems:  I reviewed the rest of organ systems personally and they are negative. Pertinent findings discussed in HPI. Physical Examination    Visit Vitals  /67 (BP 1 Location: Left upper arm, BP Patient Position: Sitting, BP Cuff Size: Large adult)   Pulse 77   Temp 97.1 °F (36.2 °C) (Temporal)   Ht 5' 4\" (1.626 m)   Wt 205 lb (93 kg)   SpO2 93%   BMI 35.19 kg/m²     Gen: Well developed, well nourished 58 y.o. female in no acute distress  Head: normocephalic, atraumatic  Mouth: Clear, no overt lesions, oral mucosa pink and moist  Neck: supple, no masses, no adenopathy or carotid bruits, trachea midline  Resp: clear to auscultation bilaterally, no wheeze, rhonchi or rales, excursions normal and symmetrical  Cardio: Regular rate and rhythm, no murmurs, clicks, gallops or rubs, no edema or varicosities  Abdomen: soft, nontender, nondistended, normoactive bowel sounds, no hernias, no hepatosplenomegaly   Neuro: sensation and strength grossly intact and symmetrical  Psych: alert and oriented to person, place and time  Vascular examination: Vascular examination shows nonpalpable pedal pulse. Monophasic signal noted on bilateral DP and PT. There is no signs of ischemia.   Skin: warm and moist.    Labs:  Hospital Outpatient Visit on 11/23/2022   Component Date Value Ref Range Status    Right dorsalis pedis BP 11/23/2022 145  mmHg Final    Left dorsalis pedis BP 11/23/2022 115  mmHg Final    Left arm BP 11/23/2022 140  mmHg Final    Right arm BP 11/23/2022 148  mmHg Final    Left posterior tibial 11/23/2022 130  mmHg Final    Right posterior tibial 11/23/2022 143  mmHg Final    Left JOSE LUIS 11/23/2022 0.88   Final    Right JOSE LUIS 11/23/2022 0.98   Final    Left toe pressure 11/23/2022 92  mmHg Final    Right toe pressure 11/23/2022 98  mmHg Final    Left TBI 11/23/2022 0.62   Final    Right TBI 11/23/2022 0.66   Final         Images:  No images are attached to the encounter. Prudencio Lopez MD    Assessments:  Patient Active Problem List   Diagnosis Code    Peripheral vascular disease ([facility] [address] I73.9       Plans: Bilateral JOSE LUIS examination most likely abnormally elevated. Patient has nonpalpable pedal pulse with monophasic signal.  Most likely patient has severe atherosclerotic occlusive disease bilateral leg. Her symptoms are more worse on the left side. We discussed about my clinical examination and setting of her severe PAD. Discussed with her angiogram with interventions. We talked about rational for the procedure risks benefits complication. Her symptoms are worse on the left side. So we will do the left side first, if she has excellent result we will work on right side. I will schedule for left leg angiogram with intervention on January 10, 2023. Patient was provided with handouts on this as well.           Prudencio Lopez MD

## 2023-01-09 ENCOUNTER — HOSPITAL ENCOUNTER (OUTPATIENT)
Dept: PREADMISSION TESTING | Age: 63
Discharge: HOME | End: 2023-01-09
Payer: MEDICAID

## 2023-01-09 VITALS
WEIGHT: 200 LBS | TEMPERATURE: 98.1 F | HEART RATE: 77 BPM | BODY MASS INDEX: 34.15 KG/M2 | OXYGEN SATURATION: 99 % | DIASTOLIC BLOOD PRESSURE: 66 MMHG | HEIGHT: 64 IN | SYSTOLIC BLOOD PRESSURE: 132 MMHG | RESPIRATION RATE: 18 BRPM

## 2023-01-09 LAB
ALBUMIN SERPL-MCNC: 3.5 G/DL (ref 3.5–5)
ALBUMIN/GLOB SERPL: 1 (ref 1.1–2.2)
ALP SERPL-CCNC: 100 U/L (ref 45–117)
ALT SERPL-CCNC: 18 U/L (ref 12–78)
ANION GAP SERPL CALC-SCNC: 6 MMOL/L (ref 5–15)
APTT PPP: 26.3 SEC (ref 21.2–34.1)
AST SERPL W P-5'-P-CCNC: 10 U/L (ref 15–37)
BILIRUB SERPL-MCNC: 0.3 MG/DL (ref 0.2–1)
BUN SERPL-MCNC: 9 MG/DL (ref 6–20)
BUN/CREAT SERPL: 12 (ref 12–20)
CA-I BLD-MCNC: 9 MG/DL (ref 8.5–10.1)
CHLORIDE SERPL-SCNC: 98 MMOL/L (ref 97–108)
CO2 SERPL-SCNC: 30 MMOL/L (ref 21–32)
CREAT SERPL-MCNC: 0.75 MG/DL (ref 0.55–1.02)
ERYTHROCYTE [DISTWIDTH] IN BLOOD BY AUTOMATED COUNT: 13.2 % (ref 11.5–14.5)
GLOBULIN SER CALC-MCNC: 3.5 G/DL (ref 2–4)
GLUCOSE SERPL-MCNC: 89 MG/DL (ref 65–100)
HCT VFR BLD AUTO: 32.9 % (ref 35–47)
HGB BLD-MCNC: 11.1 G/DL (ref 11.5–16)
INR PPP: 1 (ref 0.9–1.1)
MCH RBC QN AUTO: 27.4 PG (ref 26–34)
MCHC RBC AUTO-ENTMCNC: 33.7 G/DL (ref 30–36.5)
MCV RBC AUTO: 81.2 FL (ref 80–99)
NRBC # BLD: 0 K/UL (ref 0–0.01)
NRBC BLD-RTO: 0 PER 100 WBC
PLATELET # BLD AUTO: 357 K/UL (ref 150–400)
PMV BLD AUTO: 8.6 FL (ref 8.9–12.9)
POTASSIUM SERPL-SCNC: 3.5 MMOL/L (ref 3.5–5.1)
PROT SERPL-MCNC: 7 G/DL (ref 6.4–8.2)
PROTHROMBIN TIME: 13.2 SEC (ref 11.9–14.6)
RBC # BLD AUTO: 4.05 M/UL (ref 3.8–5.2)
SODIUM SERPL-SCNC: 134 MMOL/L (ref 136–145)
THERAPEUTIC RANGE,PTTT: NORMAL SEC (ref 82–109)
WBC # BLD AUTO: 4.9 K/UL (ref 3.6–11)

## 2023-01-09 PROCEDURE — 85027 COMPLETE CBC AUTOMATED: CPT

## 2023-01-09 PROCEDURE — 80053 COMPREHEN METABOLIC PANEL: CPT

## 2023-01-09 PROCEDURE — 36415 COLL VENOUS BLD VENIPUNCTURE: CPT

## 2023-01-09 PROCEDURE — 85730 THROMBOPLASTIN TIME PARTIAL: CPT

## 2023-01-09 PROCEDURE — 85610 PROTHROMBIN TIME: CPT

## 2023-01-09 RX ORDER — OXCARBAZEPINE 150 MG/1
600 TABLET, FILM COATED ORAL
COMMUNITY

## 2023-01-10 ENCOUNTER — HOSPITAL ENCOUNTER (OUTPATIENT)
Age: 63
Discharge: HOME | End: 2023-01-10
Attending: SURGERY | Admitting: SURGERY
Payer: MEDICAID

## 2023-01-10 ENCOUNTER — DOCUMENTATION ONLY (OUTPATIENT)
Dept: SURGERY | Age: 63
End: 2023-01-10

## 2023-01-10 VITALS
OXYGEN SATURATION: 99 % | HEART RATE: 74 BPM | SYSTOLIC BLOOD PRESSURE: 136 MMHG | DIASTOLIC BLOOD PRESSURE: 61 MMHG | RESPIRATION RATE: 16 BRPM | TEMPERATURE: 98.1 F

## 2023-01-10 DIAGNOSIS — I73.9 PVD (PERIPHERAL VASCULAR DISEASE) (HCC): ICD-10-CM

## 2023-01-10 DIAGNOSIS — I70.202 FEMORAL ARTERY STENOSIS, LEFT (HCC): ICD-10-CM

## 2023-01-10 DIAGNOSIS — I73.9 PAD (PERIPHERAL ARTERY DISEASE) (HCC): Primary | ICD-10-CM

## 2023-01-10 PROCEDURE — C1887 CATHETER, GUIDING: HCPCS | Performed by: SURGERY

## 2023-01-10 PROCEDURE — 36245 INS CATH ABD/L-EXT ART 1ST: CPT | Performed by: SURGERY

## 2023-01-10 PROCEDURE — 99153 MOD SED SAME PHYS/QHP EA: CPT | Performed by: SURGERY

## 2023-01-10 PROCEDURE — C1894 INTRO/SHEATH, NON-LASER: HCPCS | Performed by: SURGERY

## 2023-01-10 PROCEDURE — 76210000000 HC OR PH I REC 2 TO 2.5 HR: Performed by: SURGERY

## 2023-01-10 PROCEDURE — C1769 GUIDE WIRE: HCPCS | Performed by: SURGERY

## 2023-01-10 PROCEDURE — 74011250637 HC RX REV CODE- 250/637: Performed by: SURGERY

## 2023-01-10 PROCEDURE — C1884 EMBOLIZATION PROTECT SYST: HCPCS | Performed by: SURGERY

## 2023-01-10 PROCEDURE — 76937 US GUIDE VASCULAR ACCESS: CPT | Performed by: SURGERY

## 2023-01-10 PROCEDURE — 74011250636 HC RX REV CODE- 250/636: Performed by: SURGERY

## 2023-01-10 PROCEDURE — C1760 CLOSURE DEV, VASC: HCPCS | Performed by: SURGERY

## 2023-01-10 PROCEDURE — 77030029065 HC DRSG HEMO QCLOT ZMED -B: Performed by: SURGERY

## 2023-01-10 PROCEDURE — 74011000250 HC RX REV CODE- 250: Performed by: SURGERY

## 2023-01-10 PROCEDURE — 77030006078 HC TAPE GLOW N TEL LEMV -B: Performed by: SURGERY

## 2023-01-10 PROCEDURE — 77030013516 HC DEV INFL ANGI MRTM -B: Performed by: SURGERY

## 2023-01-10 PROCEDURE — C1714 CATH, TRANS ATHERECTOMY, DIR: HCPCS | Performed by: SURGERY

## 2023-01-10 PROCEDURE — 37225 HC ARTHERC FEMPOP UNI +/-PTA: CPT | Performed by: SURGERY

## 2023-01-10 PROCEDURE — 76210000021 HC REC RM PH II 0.5 TO 1 HR: Performed by: SURGERY

## 2023-01-10 PROCEDURE — 99152 MOD SED SAME PHYS/QHP 5/>YRS: CPT | Performed by: SURGERY

## 2023-01-10 PROCEDURE — 75710 ARTERY X-RAYS ARM/LEG: CPT | Performed by: SURGERY

## 2023-01-10 PROCEDURE — 75625 CONTRAST EXAM ABDOMINL AORTA: CPT | Performed by: SURGERY

## 2023-01-10 RX ORDER — FENTANYL CITRATE 50 UG/ML
INJECTION, SOLUTION INTRAMUSCULAR; INTRAVENOUS AS NEEDED
Status: DISCONTINUED | OUTPATIENT
Start: 2023-01-10 | End: 2023-01-10 | Stop reason: HOSPADM

## 2023-01-10 RX ORDER — SODIUM CHLORIDE, SODIUM LACTATE, POTASSIUM CHLORIDE, CALCIUM CHLORIDE 600; 310; 30; 20 MG/100ML; MG/100ML; MG/100ML; MG/100ML
1000 INJECTION, SOLUTION INTRAVENOUS CONTINUOUS
Status: DISCONTINUED | OUTPATIENT
Start: 2023-01-10 | End: 2023-01-10 | Stop reason: HOSPADM

## 2023-01-10 RX ORDER — SODIUM CHLORIDE 0.9 % (FLUSH) 0.9 %
5-40 SYRINGE (ML) INJECTION AS NEEDED
Status: CANCELLED | OUTPATIENT
Start: 2023-01-10

## 2023-01-10 RX ORDER — MIDAZOLAM HYDROCHLORIDE 1 MG/ML
INJECTION INTRAMUSCULAR; INTRAVENOUS AS NEEDED
Status: DISCONTINUED | OUTPATIENT
Start: 2023-01-10 | End: 2023-01-10 | Stop reason: HOSPADM

## 2023-01-10 RX ORDER — LIDOCAINE HYDROCHLORIDE 10 MG/ML
INJECTION INFILTRATION; PERINEURAL AS NEEDED
Status: DISCONTINUED | OUTPATIENT
Start: 2023-01-10 | End: 2023-01-10 | Stop reason: HOSPADM

## 2023-01-10 RX ORDER — SODIUM CHLORIDE 0.9 % (FLUSH) 0.9 %
5-40 SYRINGE (ML) INJECTION EVERY 8 HOURS
Status: CANCELLED | OUTPATIENT
Start: 2023-01-10

## 2023-01-10 RX ORDER — HEPARIN SODIUM 1000 [USP'U]/ML
INJECTION, SOLUTION INTRAVENOUS; SUBCUTANEOUS AS NEEDED
Status: DISCONTINUED | OUTPATIENT
Start: 2023-01-10 | End: 2023-01-10 | Stop reason: HOSPADM

## 2023-01-10 RX ORDER — CLOPIDOGREL 300 MG/1
300 TABLET, FILM COATED ORAL ONCE
Status: COMPLETED | OUTPATIENT
Start: 2023-01-10 | End: 2023-01-10

## 2023-01-10 RX ORDER — CLOPIDOGREL BISULFATE 75 MG/1
75 TABLET ORAL DAILY
Qty: 30 TABLET | Refills: 3 | Status: SHIPPED | OUTPATIENT
Start: 2023-01-10

## 2023-01-10 RX ORDER — SODIUM CHLORIDE 9 MG/ML
20 INJECTION, SOLUTION INTRAVENOUS CONTINUOUS
Status: DISCONTINUED | OUTPATIENT
Start: 2023-01-10 | End: 2023-01-10 | Stop reason: HOSPADM

## 2023-01-10 RX ORDER — HEPARIN SODIUM 200 [USP'U]/100ML
INJECTION, SOLUTION INTRAVENOUS
Status: COMPLETED | OUTPATIENT
Start: 2023-01-10 | End: 2023-01-10

## 2023-01-10 RX ORDER — DIPHENHYDRAMINE HYDROCHLORIDE 50 MG/ML
INJECTION, SOLUTION INTRAMUSCULAR; INTRAVENOUS AS NEEDED
Status: DISCONTINUED | OUTPATIENT
Start: 2023-01-10 | End: 2023-01-10 | Stop reason: HOSPADM

## 2023-01-10 RX ADMIN — CLOPIDOGREL BISULFATE 300 MG: 300 TABLET, FILM COATED ORAL at 17:24

## 2023-01-10 RX ADMIN — SODIUM CHLORIDE, POTASSIUM CHLORIDE, SODIUM LACTATE AND CALCIUM CHLORIDE 1000 ML: 600; 310; 30; 20 INJECTION, SOLUTION INTRAVENOUS at 13:28

## 2023-01-10 NOTE — PERIOP NOTES
Right groin venous sheath pulled per orders, no complications, manual pressure applied x 10min,  site no bleeding, no swelling, no hematoma, site covered with Quik clot and tegaderm. Pt. Tolerated without difficulty.

## 2023-01-10 NOTE — Clinical Note
Vessel: bilateral AA w/ RunoffiliacAA w/ Runoffiliac. Hand injection to the artery. Single view taken.

## 2023-01-10 NOTE — H&P
Vascular History and Physical     Patient: Maddie Gale                    MRN: [id number]          YOB: 1960          Age: 58 y.o. Sex: female      Chief Complaint:       Chief Complaint   Patient presents with    New Patient       Consultation-PAD         History of Present Illness: Maddie Gale is a 58 y.o. very pleasant woman is here today for vascular evaluation. Patient has had both leg numbness and pain for some time now. Patient was told she has PAD. And has failed conservative management for PAD. Recent JOSE LUIS examination shows right ankle index 0.98 and TBI 0.6, on the left side 0.98 and TBI 0.6. Patient's symptoms are more constant now. Patient denies any chest pain, recent MI or stroke. Denies abdominal pain or weight loss. Social History:  Social Connections: Not on file         Past Medical History:       Past Medical History:   Diagnosis Date    Arthritis      Family history of ischemic heart disease      Smoker      Trigeminal neuralgia           Surgical History:        Past Surgical History:   Procedure Laterality Date    HX GASTRIC BYPASS   1991    HX HYSTERECTOMY   2001         Allergies: Allergies   Allergen Reactions    Tramadol Itching         Current Meds:         Current Outpatient Medications   Medication Sig Dispense Refill    hydroCHLOROthiazide (HYDRODIURIL) 25 mg tablet Take 25 mg by mouth daily. diclofenac EC (VOLTAREN) 75 mg EC tablet TAKE 1 TABLET 2 TIMES A DAY WITH FOOD        cyanocobalamin 1,000 mcg tablet Take 1,000 mcg by mouth daily. OXcarbazepine (TRILEPTAL) 150 mg tablet Take 450 mg by mouth two (2) times a day. amitriptyline (ELAVIL) 25 mg tablet Take 1 Tablet by mouth nightly. (Patient not taking: Reported on 12/19/2022) 90 Tablet 0    celecoxib (CELEBREX) 200 mg capsule Take 200 mg by mouth two (2) times a day.  (Patient not taking: Reported on 12/19/2022)        losartan-hydroCHLOROthiazide (HYZAAR) 100-12.5 mg per tablet Take 1 Tablet by mouth daily. (Patient not taking: Reported on 12/19/2022)        ergocalciferol (ERGOCALCIFEROL) 1,250 mcg (50,000 unit) capsule  (Patient not taking: No sig reported)             Review of Systems:  I reviewed the rest of organ systems personally and they are negative. Pertinent findings discussed in HPI. Physical Examination     Visit Vitals  /67 (BP 1 Location: Left upper arm, BP Patient Position: Sitting, BP Cuff Size: Large adult)   Pulse 77   Temp 97.1 °F (36.2 °C) (Temporal)   Ht 5' 4\" (1.626 m)   Wt 205 lb (93 kg)   SpO2 93%   BMI 35.19 kg/m²      Gen: Well developed, well nourished 58 y.o. female in no acute distress  Head: normocephalic, atraumatic  Mouth: Clear, no overt lesions, oral mucosa pink and moist  Neck: supple, no masses, no adenopathy or carotid bruits, trachea midline  Resp: clear to auscultation bilaterally, no wheeze, rhonchi or rales, excursions normal and symmetrical  Cardio: Regular rate and rhythm, no murmurs, clicks, gallops or rubs, no edema or varicosities  Abdomen: soft, nontender, nondistended, normoactive bowel sounds, no hernias, no hepatosplenomegaly   Neuro: sensation and strength grossly intact and symmetrical  Psych: alert and oriented to person, place and time  Vascular examination: Vascular examination shows nonpalpable pedal pulse. Monophasic signal noted on bilateral DP and PT. There is no signs of ischemia.   Skin: warm and moist.     Labs:          Hospital Outpatient Visit on 11/23/2022   Component Date Value Ref Range Status    Right dorsalis pedis BP 11/23/2022 145  mmHg Final    Left dorsalis pedis BP 11/23/2022 115  mmHg Final    Left arm BP 11/23/2022 140  mmHg Final    Right arm BP 11/23/2022 148  mmHg Final    Left posterior tibial 11/23/2022 130  mmHg Final    Right posterior tibial 11/23/2022 143  mmHg Final    Left JOSE LUIS 11/23/2022 0.88    Final    Right JOSE LUIS 11/23/2022 0.98    Final    Left toe pressure 11/23/2022 92  mmHg Final    Right toe pressure 11/23/2022 98  mmHg Final    Left TBI 11/23/2022 0.62    Final    Right TBI 11/23/2022 0.66    Final            Images:  No images are attached to the encounter. Yarelis Marshall MD     Assessments:       Patient Active Problem List   Diagnosis Code    Peripheral vascular disease ([facility] [address] I73.9         Plans: Bilateral JOSE LUIS examination most likely abnormally elevated. Patient has nonpalpable pedal pulse with monophasic signal.  Most likely patient has severe atherosclerotic occlusive disease bilateral leg. Her symptoms are more worse on the left side. We discussed about my clinical examination and setting of her severe PAD. Discussed with her angiogram with interventions. We talked about rational for the procedure risks benefits complication. Her symptoms are worse on the left side. So we will do the left side first, if she has excellent result we will work on right side. I will schedule for left leg angiogram with intervention on January 10, 2023. Patient was provided with handouts on this as well.

## 2023-01-10 NOTE — Clinical Note
Vessel: left AA w/ Runoffiliac, CFA, PFA, SFA and popliteal. Hand injection to the artery. Single view taken.

## 2023-01-10 NOTE — Clinical Note
Right femoral vein. Accessed successfully. Micropuncture needle used. Using fluoro and ultrasound guidance.

## 2023-01-10 NOTE — ROUTINE PROCESS
TRANSFER - OUT REPORT:    Verbal report given to Renée (name) on José Miguel Amaya  being transferred to [facility](unit) for routine post - op       Report consisted of patients Situation, Background, Assessment and   Recommendations(SBAR). Information from the following report(s) SBAR, Procedure Summary, Intake/Output, Recent Results, Cardiac Rhythm SR, and Dual Neuro Assessment was reviewed with the receiving nurse. Opportunity for questions and clarification was provided.       Patient transported with:   Registered Nurse Micheline Coyne

## 2023-01-10 NOTE — Clinical Note
Sheath: inserted. Sheath inserted/placed in the right femoral  vein. Hemostasis not achieved. Upon evaluation of the common femoral artery stick using fluoroscopy, the access site puncture was within the safe zone.

## 2023-01-10 NOTE — Clinical Note
Right femoral artery. Accessed successfully. Micropuncture needle used. Using fluoro and ultrasound guidance. Number of attempts =  2.

## 2023-01-10 NOTE — Clinical Note
TRANSFER - OUT REPORT:     Verbal report given to: Kavya, (at bedside). Report consisted of patient's Situation, Background, Assessment and   Recommendations(SBAR). Opportunity for questions and clarification was provided. Patient transported with a Registered Nurse. Patient transported to: recovery. Advised about venous sheath left in place.

## 2023-01-11 NOTE — PROGRESS NOTES
Pressure dressing removed. Discharge instructions/education provided to Stephanie, daughter, she verbalized understanding and had no questions. Patient discharged in wheelchair to main entrance of hospital to home with daughter via private vehicle.

## 2023-01-30 ENCOUNTER — OFFICE VISIT (OUTPATIENT)
Dept: SURGERY | Age: 63
End: 2023-01-30
Payer: MEDICAID

## 2023-01-30 VITALS
HEART RATE: 74 BPM | DIASTOLIC BLOOD PRESSURE: 69 MMHG | HEIGHT: 64 IN | TEMPERATURE: 97.3 F | BODY MASS INDEX: 34.31 KG/M2 | SYSTOLIC BLOOD PRESSURE: 117 MMHG | OXYGEN SATURATION: 98 % | WEIGHT: 201 LBS

## 2023-01-30 DIAGNOSIS — I73.9 PAD (PERIPHERAL ARTERY DISEASE) (HCC): Primary | ICD-10-CM

## 2023-01-30 PROCEDURE — 99213 OFFICE O/P EST LOW 20 MIN: CPT | Performed by: SURGERY

## 2023-01-30 NOTE — PROGRESS NOTES
Identified pt with two pt identifiers (name and ). Reviewed chart in preparation for visit and have obtained necessary documentation. Camille Salazar is a 58 y.o. female  Chief Complaint   Patient presents with    Follow-up     2week- PAD (peripheral artery disease)     Visit Vitals  /69 (BP 1 Location: Left upper arm, BP Patient Position: Sitting, BP Cuff Size: Adult long)   Pulse 74   Temp 97.3 °F (36.3 °C) (Temporal)   Ht 5' 4\" (1.626 m)   Wt 201 lb (91.2 kg)   SpO2 98%   BMI 34.50 kg/m²       1. Have you been to the ER, urgent care clinic since your last visit? Hospitalized since your last visit? No    2. Have you seen or consulted any other health care providers outside of the [address] [place] [address] since your last visit? Include any pap smears or colon screening.  No

## 2023-01-30 NOTE — PROGRESS NOTES
VASCULAR FOLLOW UP      Subjective:   CHIEF COMPLAINTS:  Left leg angioplasty  PRESENTATION OF ILLNESS:  Jw Block is a 58 y.o. very pleasant woman she had left leg angiogram angioplasty. Patient says left foot feels much better now foot is warm and she is able to walk better. Now the problem is right leg. Patient still complain of numbness all the time of the right foot. Past Medical History:   Diagnosis Date    Arthritis     Family history of ischemic heart disease     Smoker     Swelling     ankles, hands    Trigeminal neuralgia       Past Surgical History:   Procedure Laterality Date    HX GASTRIC BYPASS  1991    HX HYSTERECTOMY  2001     Family History   Problem Relation Age of Onset    Heart Disease Mother     Heart Attack Father       Social History     Tobacco Use    Smoking status: Every Day     Packs/day: 0.25     Years: 10.00     Pack years: 2.50     Types: Cigarettes    Smokeless tobacco: Never   Substance Use Topics    Alcohol use: Never       Prior to Admission medications    Medication Sig Start Date End Date Taking? Authorizing Provider   clopidogreL (Plavix) 75 mg tab Take 1 Tablet by mouth daily. 1/10/23  Yes Ioana Bell MD   OXcarbazepine (TRILEPTAL) 150 mg tablet Take 600 mg by mouth nightly. Indications: facial nerve pain   Yes Provider, Historical   hydroCHLOROthiazide (HYDRODIURIL) 25 mg tablet Take 25 mg by mouth daily. 11/18/22  Yes Provider, Historical   diclofenac EC (VOLTAREN) 75 mg EC tablet Take 75 mg by mouth two (2) times a day. 11/18/22  Yes Provider, Historical   cyanocobalamin 1,000 mcg tablet Take 1,000 mcg by mouth daily. Yes Provider, Historical   OXcarbazepine (TRILEPTAL) 150 mg tablet Take 450 mg by mouth Every morning.  Indications: facial nerve pain 5/26/21  Yes Provider, Historical     Allergies   Allergen Reactions    Tramadol Hives and Itching        Review of Systems:  I reviewed the rest of organ systems personally and they were negative signed by Dr. Cindy Avila    Objective:   Visit Vitals  /69 (BP 1 Location: Left upper arm, BP Patient Position: Sitting, BP Cuff Size: Adult long)   Pulse 74   Temp 97.3 °F (36.3 °C) (Temporal)   Ht 5' 4\" (1.626 m)   Wt 201 lb (91.2 kg)   SpO2 98%   BMI 34.50 kg/m²     VITAL SIGNS REVIEWED. Physical Exam:  Patient is well-nourished pleasant in conversation is appropriate. Head and neck examination atraumatic, normocephalic. Gaze appropriate. Conversation appropriate. Neck examination shows supple. No mass. No obvious carotid bruit. Chest examination shows lungs are clear bilaterally well-expanded, no crackles or wheezes. Cardiovascular system regular rate, no obvious murmur. Skin warm to touch  and moist, no skin lesions. Abdomen is soft ,not tender or distended bowel sounds present. No palpable mass. Neurological examinations, no focal neuro deficits moving all 4 extremities. Cranial nerves intact. Sensation is intact as well. Hematologic: No obvious bruise or swelling or obvious lymphadenopathy. Psychosocial: Appropriate. Has good effect. Musculoskeletal system: No muscle wasting, appropriate movements upper and lower extremity. Vascular examination: Patient has a palpable PT on the left foot.         Data Review:   Hospital Outpatient Visit on 01/09/2023   Component Date Value Ref Range Status    WBC 01/09/2023 4.9  3.6 - 11.0 K/uL Final    RBC 01/09/2023 4.05  3.80 - 5.20 M/uL Final    HGB 01/09/2023 11.1 (A)  11.5 - 16.0 g/dL Final    HCT 01/09/2023 32.9 (A)  35.0 - 47.0 % Final    MCV 01/09/2023 81.2  80.0 - 99.0 FL Final    MCH 01/09/2023 27.4  26.0 - 34.0 PG Final    MCHC 01/09/2023 33.7  30.0 - 36.5 g/dL Final    RDW 01/09/2023 13.2  11.5 - 14.5 % Final    PLATELET 56/20/8582 482  150 - 400 K/uL Final    MPV 01/09/2023 8.6 (A)  8.9 - 12.9 FL Final    NRBC 01/09/2023 0.0  0.0  WBC Final    ABSOLUTE NRBC 01/09/2023 0.00  0.00 - 0.01 K/uL Final    Sodium 01/09/2023 134 (A)  136 - 145 mmol/L Final    Potassium 01/09/2023 3.5  3.5 - 5.1 mmol/L Final    Chloride 01/09/2023 98  97 - 108 mmol/L Final    CO2 01/09/2023 30  21 - 32 mmol/L Final    Anion gap 01/09/2023 6  5 - 15 mmol/L Final    Glucose 01/09/2023 89  65 - 100 mg/dL Final    BUN 01/09/2023 9  6 - 20 mg/dL Final    Creatinine 01/09/2023 0.75  0.55 - 1.02 mg/dL Final    BUN/Creatinine ratio 01/09/2023 12  12 - 20   Final    eGFR 01/09/2023 >60  >60 ml/min/1.73m2 Final    Calcium 01/09/2023 9.0  8.5 - 10.1 mg/dL Final    Bilirubin, total 01/09/2023 0.3  0.2 - 1.0 mg/dL Final    AST (SGOT) 01/09/2023 10 (A)  15 - 37 U/L Final    ALT (SGPT) 01/09/2023 18  12 - 78 U/L Final    Alk. phosphatase 01/09/2023 100  45 - 117 U/L Final    Protein, total 01/09/2023 7.0  6.4 - 8.2 g/dL Final    Albumin 01/09/2023 3.5  3.5 - 5.0 g/dL Final    Globulin 01/09/2023 3.5  2.0 - 4.0 g/dL Final    A-G Ratio 01/09/2023 1.0 (A)  1.1 - 2.2   Final    Prothrombin time 01/09/2023 13.2  11.9 - 14.6 sec Final    INR 01/09/2023 1.0  0.9 - 1.1   Final    aPTT 01/09/2023 26.3  21.2 - 34.1 sec Final    aPTT, therapeutic range 01/09/2023    82 - 109 sec Final        Assessment:     Problem List Items Addressed This Visit          Circulatory    PAD (peripheral artery disease) ([facility] [address] - Primary           Plan:     Patient has excellent results on left leg. And we will schedule for right leg angiogram angioplasty on the March 7, 2023. Patient was discussed rational for the procedure, risks benefits and complication.         Guzman Davis MD

## 2023-03-06 ENCOUNTER — HOSPITAL ENCOUNTER (OUTPATIENT)
Dept: PREADMISSION TESTING | Age: 63
Discharge: HOME | End: 2023-03-06
Payer: MEDICAID

## 2023-03-06 ENCOUNTER — TELEPHONE (OUTPATIENT)
Dept: SURGERY | Age: 63
End: 2023-03-06

## 2023-03-06 VITALS
TEMPERATURE: 98.2 F | RESPIRATION RATE: 18 BRPM | HEIGHT: 64 IN | WEIGHT: 198.8 LBS | SYSTOLIC BLOOD PRESSURE: 129 MMHG | HEART RATE: 69 BPM | BODY MASS INDEX: 33.94 KG/M2 | OXYGEN SATURATION: 97 % | DIASTOLIC BLOOD PRESSURE: 60 MMHG

## 2023-03-06 LAB
ALBUMIN SERPL-MCNC: 3.5 G/DL (ref 3.5–5)
ALBUMIN/GLOB SERPL: 0.9 (ref 1.1–2.2)
ALP SERPL-CCNC: 96 U/L (ref 45–117)
ALT SERPL-CCNC: 17 U/L (ref 12–78)
ANION GAP SERPL CALC-SCNC: 6 MMOL/L (ref 5–15)
APTT PPP: 28.8 SEC (ref 21.2–34.1)
AST SERPL W P-5'-P-CCNC: 12 U/L (ref 15–37)
BASOPHILS # BLD: 0 K/UL (ref 0–0.1)
BASOPHILS NFR BLD: 0 % (ref 0–1)
BILIRUB SERPL-MCNC: 0.2 MG/DL (ref 0.2–1)
BUN SERPL-MCNC: 14 MG/DL (ref 6–20)
BUN/CREAT SERPL: 20 (ref 12–20)
CA-I BLD-MCNC: 9.1 MG/DL (ref 8.5–10.1)
CHLORIDE SERPL-SCNC: 91 MMOL/L (ref 97–108)
CO2 SERPL-SCNC: 27 MMOL/L (ref 21–32)
CREAT SERPL-MCNC: 0.71 MG/DL (ref 0.55–1.02)
DIFFERENTIAL METHOD BLD: ABNORMAL
EOSINOPHIL # BLD: 0.1 K/UL (ref 0–0.4)
EOSINOPHIL NFR BLD: 1 % (ref 0–7)
ERYTHROCYTE [DISTWIDTH] IN BLOOD BY AUTOMATED COUNT: 13 % (ref 11.5–14.5)
GLOBULIN SER CALC-MCNC: 3.7 G/DL (ref 2–4)
GLUCOSE SERPL-MCNC: 93 MG/DL (ref 65–100)
HCT VFR BLD AUTO: 33 % (ref 35–47)
HGB BLD-MCNC: 11.3 G/DL (ref 11.5–16)
IMM GRANULOCYTES # BLD AUTO: 0 K/UL (ref 0–0.04)
IMM GRANULOCYTES NFR BLD AUTO: 0 % (ref 0–0.5)
INR PPP: 1 (ref 0.9–1.1)
LYMPHOCYTES # BLD: 1.6 K/UL (ref 0.8–3.5)
LYMPHOCYTES NFR BLD: 32 % (ref 12–49)
MCH RBC QN AUTO: 27 PG (ref 26–34)
MCHC RBC AUTO-ENTMCNC: 34.2 G/DL (ref 30–36.5)
MCV RBC AUTO: 78.9 FL (ref 80–99)
MONOCYTES # BLD: 0.4 K/UL (ref 0–1)
MONOCYTES NFR BLD: 7 % (ref 5–13)
NEUTS SEG # BLD: 2.9 K/UL (ref 1.8–8)
NEUTS SEG NFR BLD: 60 % (ref 32–75)
NRBC # BLD: 0 K/UL (ref 0–0.01)
NRBC BLD-RTO: 0 PER 100 WBC
PLATELET # BLD AUTO: 355 K/UL (ref 150–400)
PMV BLD AUTO: 8.8 FL (ref 8.9–12.9)
POTASSIUM SERPL-SCNC: 3.9 MMOL/L (ref 3.5–5.1)
PROT SERPL-MCNC: 7.2 G/DL (ref 6.4–8.2)
PROTHROMBIN TIME: 13.3 SEC (ref 11.9–14.6)
RBC # BLD AUTO: 4.18 M/UL (ref 3.8–5.2)
SODIUM SERPL-SCNC: 124 MMOL/L (ref 136–145)
THERAPEUTIC RANGE,PTTT: NORMAL SEC (ref 82–109)
WBC # BLD AUTO: 4.9 K/UL (ref 3.6–11)

## 2023-03-06 PROCEDURE — 85610 PROTHROMBIN TIME: CPT

## 2023-03-06 PROCEDURE — 85730 THROMBOPLASTIN TIME PARTIAL: CPT

## 2023-03-06 PROCEDURE — 36415 COLL VENOUS BLD VENIPUNCTURE: CPT

## 2023-03-06 PROCEDURE — 80053 COMPREHEN METABOLIC PANEL: CPT

## 2023-03-06 PROCEDURE — 85025 COMPLETE CBC W/AUTO DIFF WBC: CPT

## 2023-03-06 NOTE — PROGRESS NOTES
Iris Feldman, in office, to notify Dr. Dinora Alexander to review abnormal lab values. Renetta Gonzalez in [address] lab aware as well.

## 2023-03-06 NOTE — TELEPHONE ENCOUNTER
Sveta Garcia from [facility] called for Dr Aruna Shaffer to let him know she thinks the patients labs look funny, gave sticky note to Dr Aruna Shaffer w/ Yuliya Lua information.

## 2023-03-07 ENCOUNTER — DOCUMENTATION ONLY (OUTPATIENT)
Dept: SURGERY | Age: 63
End: 2023-03-07

## 2023-03-07 ENCOUNTER — HOSPITAL ENCOUNTER (OUTPATIENT)
Age: 63
Setting detail: OBSERVATION
Discharge: HOME | End: 2023-03-08
Attending: SURGERY | Admitting: SURGERY
Payer: MEDICAID

## 2023-03-07 DIAGNOSIS — I70.201 FEMORAL ARTERY STENOSIS, RIGHT (HCC): Primary | ICD-10-CM

## 2023-03-07 DIAGNOSIS — I73.9 PVD (PERIPHERAL VASCULAR DISEASE) (HCC): ICD-10-CM

## 2023-03-07 PROBLEM — I95.9 HYPOTENSION: Status: ACTIVE | Noted: 2023-03-07

## 2023-03-07 LAB
ANION GAP SERPL CALC-SCNC: 5 MMOL/L (ref 5–15)
ANION GAP SERPL CALC-SCNC: 6 MMOL/L (ref 5–15)
BUN SERPL-MCNC: 12 MG/DL (ref 6–20)
BUN SERPL-MCNC: 13 MG/DL (ref 6–20)
BUN/CREAT SERPL: 16 (ref 12–20)
BUN/CREAT SERPL: 21 (ref 12–20)
CA-I BLD-MCNC: 7.6 MG/DL (ref 8.5–10.1)
CA-I BLD-MCNC: 7.9 MG/DL (ref 8.5–10.1)
CHLORIDE SERPL-SCNC: 93 MMOL/L (ref 97–108)
CHLORIDE SERPL-SCNC: 94 MMOL/L (ref 97–108)
CO2 SERPL-SCNC: 23 MMOL/L (ref 21–32)
CO2 SERPL-SCNC: 27 MMOL/L (ref 21–32)
CREAT SERPL-MCNC: 0.57 MG/DL (ref 0.55–1.02)
CREAT SERPL-MCNC: 0.79 MG/DL (ref 0.55–1.02)
GLUCOSE BLD STRIP.AUTO-MCNC: 152 MG/DL (ref 65–100)
GLUCOSE BLD STRIP.AUTO-MCNC: 349 MG/DL (ref 65–100)
GLUCOSE SERPL-MCNC: 269 MG/DL (ref 65–100)
GLUCOSE SERPL-MCNC: 92 MG/DL (ref 65–100)
HCT VFR BLD AUTO: 25.6 % (ref 35–47)
HCT VFR BLD AUTO: 25.9 % (ref 35–47)
HGB BLD-MCNC: 8.9 G/DL (ref 11.5–16)
HGB BLD-MCNC: 9 G/DL (ref 11.5–16)
PERFORMED BY, TECHID: ABNORMAL
PERFORMED BY, TECHID: ABNORMAL
POTASSIUM SERPL-SCNC: 2.9 MMOL/L (ref 3.5–5.1)
POTASSIUM SERPL-SCNC: 3.3 MMOL/L (ref 3.5–5.1)
SODIUM SERPL-SCNC: 122 MMOL/L (ref 136–145)
SODIUM SERPL-SCNC: 126 MMOL/L (ref 136–145)

## 2023-03-07 PROCEDURE — C1769 GUIDE WIRE: HCPCS | Performed by: SURGERY

## 2023-03-07 PROCEDURE — C1894 INTRO/SHEATH, NON-LASER: HCPCS | Performed by: SURGERY

## 2023-03-07 PROCEDURE — 99152 MOD SED SAME PHYS/QHP 5/>YRS: CPT | Performed by: SURGERY

## 2023-03-07 PROCEDURE — 75625 CONTRAST EXAM ABDOMINL AORTA: CPT | Performed by: SURGERY

## 2023-03-07 PROCEDURE — 77030013516 HC DEV INFL ANGI MRTM -B: Performed by: SURGERY

## 2023-03-07 PROCEDURE — 74011250637 HC RX REV CODE- 250/637: Performed by: INTERNAL MEDICINE

## 2023-03-07 PROCEDURE — 99153 MOD SED SAME PHYS/QHP EA: CPT | Performed by: SURGERY

## 2023-03-07 PROCEDURE — 37225 HC ARTHERC FEMPOP UNI +/-PTA: CPT | Performed by: SURGERY

## 2023-03-07 PROCEDURE — C1887 CATHETER, GUIDING: HCPCS | Performed by: SURGERY

## 2023-03-07 PROCEDURE — 76210000031 HC REC RM PH II 4.5 TO 5 HR: Performed by: SURGERY

## 2023-03-07 PROCEDURE — C1884 EMBOLIZATION PROTECT SYST: HCPCS | Performed by: SURGERY

## 2023-03-07 PROCEDURE — 74011000250 HC RX REV CODE- 250: Performed by: SURGERY

## 2023-03-07 PROCEDURE — 80048 BASIC METABOLIC PNL TOTAL CA: CPT

## 2023-03-07 PROCEDURE — 76210000006 HC OR PH I REC 0.5 TO 1 HR: Performed by: SURGERY

## 2023-03-07 PROCEDURE — C1714 CATH, TRANS ATHERECTOMY, DIR: HCPCS | Performed by: SURGERY

## 2023-03-07 PROCEDURE — C1760 CLOSURE DEV, VASC: HCPCS | Performed by: SURGERY

## 2023-03-07 PROCEDURE — 74011000636 HC RX REV CODE- 636: Performed by: SURGERY

## 2023-03-07 PROCEDURE — 82962 GLUCOSE BLOOD TEST: CPT

## 2023-03-07 PROCEDURE — 85014 HEMATOCRIT: CPT

## 2023-03-07 PROCEDURE — 76937 US GUIDE VASCULAR ACCESS: CPT | Performed by: SURGERY

## 2023-03-07 PROCEDURE — 86900 BLOOD TYPING SEROLOGIC ABO: CPT

## 2023-03-07 PROCEDURE — 77030006078 HC TAPE GLOW N TEL LEMV -B: Performed by: SURGERY

## 2023-03-07 PROCEDURE — 36245 INS CATH ABD/L-EXT ART 1ST: CPT | Performed by: SURGERY

## 2023-03-07 PROCEDURE — 74011250637 HC RX REV CODE- 250/637: Performed by: SURGERY

## 2023-03-07 PROCEDURE — G0378 HOSPITAL OBSERVATION PER HR: HCPCS

## 2023-03-07 PROCEDURE — 85018 HEMOGLOBIN: CPT

## 2023-03-07 PROCEDURE — 87641 MR-STAPH DNA AMP PROBE: CPT

## 2023-03-07 PROCEDURE — 74011250636 HC RX REV CODE- 250/636: Performed by: INTERNAL MEDICINE

## 2023-03-07 PROCEDURE — 36415 COLL VENOUS BLD VENIPUNCTURE: CPT

## 2023-03-07 PROCEDURE — 74011250636 HC RX REV CODE- 250/636: Performed by: SURGERY

## 2023-03-07 PROCEDURE — 75710 ARTERY X-RAYS ARM/LEG: CPT | Performed by: SURGERY

## 2023-03-07 RX ORDER — SODIUM CHLORIDE 0.9 % (FLUSH) 0.9 %
5-40 SYRINGE (ML) INJECTION AS NEEDED
Status: DISCONTINUED | OUTPATIENT
Start: 2023-03-07 | End: 2023-03-08 | Stop reason: HOSPADM

## 2023-03-07 RX ORDER — CILOSTAZOL 50 MG/1
100 TABLET ORAL
Status: COMPLETED | OUTPATIENT
Start: 2023-03-07 | End: 2023-03-07

## 2023-03-07 RX ORDER — SODIUM CHLORIDE 9 MG/ML
75 INJECTION, SOLUTION INTRAVENOUS CONTINUOUS
Status: DISCONTINUED | OUTPATIENT
Start: 2023-03-07 | End: 2023-03-08 | Stop reason: HOSPADM

## 2023-03-07 RX ORDER — FENTANYL CITRATE 50 UG/ML
INJECTION, SOLUTION INTRAMUSCULAR; INTRAVENOUS AS NEEDED
Status: DISCONTINUED | OUTPATIENT
Start: 2023-03-07 | End: 2023-03-07 | Stop reason: HOSPADM

## 2023-03-07 RX ORDER — POTASSIUM CHLORIDE 7.45 MG/ML
10 INJECTION INTRAVENOUS
Status: COMPLETED | OUTPATIENT
Start: 2023-03-07 | End: 2023-03-08

## 2023-03-07 RX ORDER — SODIUM CHLORIDE 9 MG/ML
500 INJECTION, SOLUTION INTRAVENOUS CONTINUOUS
Status: DISCONTINUED | OUTPATIENT
Start: 2023-03-07 | End: 2023-03-07

## 2023-03-07 RX ORDER — ONDANSETRON 2 MG/ML
4 INJECTION INTRAMUSCULAR; INTRAVENOUS
Status: DISCONTINUED | OUTPATIENT
Start: 2023-03-07 | End: 2023-03-08 | Stop reason: HOSPADM

## 2023-03-07 RX ORDER — MIDAZOLAM HYDROCHLORIDE 1 MG/ML
INJECTION INTRAMUSCULAR; INTRAVENOUS AS NEEDED
Status: DISCONTINUED | OUTPATIENT
Start: 2023-03-07 | End: 2023-03-07 | Stop reason: HOSPADM

## 2023-03-07 RX ORDER — SODIUM CHLORIDE 9 MG/ML
250 INJECTION, SOLUTION INTRAVENOUS AS NEEDED
Status: DISCONTINUED | OUTPATIENT
Start: 2023-03-07 | End: 2023-03-08 | Stop reason: HOSPADM

## 2023-03-07 RX ORDER — SODIUM CHLORIDE 0.9 % (FLUSH) 0.9 %
5-40 SYRINGE (ML) INJECTION EVERY 8 HOURS
Status: DISCONTINUED | OUTPATIENT
Start: 2023-03-07 | End: 2023-03-08 | Stop reason: HOSPADM

## 2023-03-07 RX ORDER — SODIUM CHLORIDE 9 MG/ML
50 INJECTION, SOLUTION INTRAVENOUS CONTINUOUS
Status: DISCONTINUED | OUTPATIENT
Start: 2023-03-07 | End: 2023-03-07

## 2023-03-07 RX ORDER — CILOSTAZOL 50 MG/1
50 TABLET ORAL
Qty: 60 TABLET | Refills: 1 | Status: SHIPPED | OUTPATIENT
Start: 2023-03-07 | End: 2023-04-06

## 2023-03-07 RX ORDER — ACETAMINOPHEN 325 MG/1
650 TABLET ORAL
Status: DISCONTINUED | OUTPATIENT
Start: 2023-03-07 | End: 2023-03-08 | Stop reason: HOSPADM

## 2023-03-07 RX ORDER — LIDOCAINE HYDROCHLORIDE 10 MG/ML
INJECTION INFILTRATION; PERINEURAL AS NEEDED
Status: DISCONTINUED | OUTPATIENT
Start: 2023-03-07 | End: 2023-03-07 | Stop reason: HOSPADM

## 2023-03-07 RX ORDER — INSULIN LISPRO 100 [IU]/ML
INJECTION, SOLUTION INTRAVENOUS; SUBCUTANEOUS EVERY 4 HOURS
Status: DISCONTINUED | OUTPATIENT
Start: 2023-03-07 | End: 2023-03-08 | Stop reason: HOSPADM

## 2023-03-07 RX ORDER — HYDROCODONE BITARTRATE AND ACETAMINOPHEN 7.5; 325 MG/1; MG/1
1 TABLET ORAL
Status: DISCONTINUED | OUTPATIENT
Start: 2023-03-07 | End: 2023-03-08 | Stop reason: HOSPADM

## 2023-03-07 RX ORDER — NITROGLYCERIN 5 MG/ML
INJECTION, SOLUTION INTRAVENOUS AS NEEDED
Status: DISCONTINUED | OUTPATIENT
Start: 2023-03-07 | End: 2023-03-07 | Stop reason: HOSPADM

## 2023-03-07 RX ORDER — POLYETHYLENE GLYCOL 3350 17 G/17G
17 POWDER, FOR SOLUTION ORAL DAILY PRN
Status: DISCONTINUED | OUTPATIENT
Start: 2023-03-07 | End: 2023-03-08 | Stop reason: HOSPADM

## 2023-03-07 RX ORDER — HEPARIN SODIUM 200 [USP'U]/100ML
INJECTION, SOLUTION INTRAVENOUS
Status: COMPLETED | OUTPATIENT
Start: 2023-03-07 | End: 2023-03-07

## 2023-03-07 RX ORDER — ACETAMINOPHEN 650 MG/1
650 SUPPOSITORY RECTAL
Status: DISCONTINUED | OUTPATIENT
Start: 2023-03-07 | End: 2023-03-08 | Stop reason: HOSPADM

## 2023-03-07 RX ORDER — POTASSIUM CHLORIDE 7.45 MG/ML
10 INJECTION INTRAVENOUS
Status: DISCONTINUED | OUTPATIENT
Start: 2023-03-07 | End: 2023-03-07

## 2023-03-07 RX ORDER — HEPARIN SODIUM 1000 [USP'U]/ML
INJECTION, SOLUTION INTRAVENOUS; SUBCUTANEOUS AS NEEDED
Status: DISCONTINUED | OUTPATIENT
Start: 2023-03-07 | End: 2023-03-07 | Stop reason: HOSPADM

## 2023-03-07 RX ORDER — CLOPIDOGREL BISULFATE 75 MG/1
75 TABLET ORAL DAILY
Status: DISCONTINUED | OUTPATIENT
Start: 2023-03-08 | End: 2023-03-08 | Stop reason: HOSPADM

## 2023-03-07 RX ORDER — ENOXAPARIN SODIUM 100 MG/ML
40 INJECTION SUBCUTANEOUS DAILY
Status: DISCONTINUED | OUTPATIENT
Start: 2023-03-08 | End: 2023-03-08 | Stop reason: HOSPADM

## 2023-03-07 RX ORDER — IBUPROFEN 200 MG
4 TABLET ORAL AS NEEDED
Status: DISCONTINUED | OUTPATIENT
Start: 2023-03-07 | End: 2023-03-08 | Stop reason: HOSPADM

## 2023-03-07 RX ORDER — SODIUM CHLORIDE 9 MG/ML
100 INJECTION, SOLUTION INTRAVENOUS CONTINUOUS
Status: DISCONTINUED | OUTPATIENT
Start: 2023-03-07 | End: 2023-03-08 | Stop reason: HOSPADM

## 2023-03-07 RX ORDER — ONDANSETRON 4 MG/1
4 TABLET, ORALLY DISINTEGRATING ORAL
Status: DISCONTINUED | OUTPATIENT
Start: 2023-03-07 | End: 2023-03-08 | Stop reason: HOSPADM

## 2023-03-07 RX ORDER — POTASSIUM CHLORIDE 20 MEQ/1
40 TABLET, EXTENDED RELEASE ORAL
Status: COMPLETED | OUTPATIENT
Start: 2023-03-07 | End: 2023-03-07

## 2023-03-07 RX ADMIN — CILOSTAZOL 100 MG: 50 TABLET ORAL at 14:54

## 2023-03-07 RX ADMIN — POTASSIUM CHLORIDE 10 MEQ: 7.46 INJECTION, SOLUTION INTRAVENOUS at 23:00

## 2023-03-07 RX ADMIN — POTASSIUM CHLORIDE 40 MEQ: 1500 TABLET, EXTENDED RELEASE ORAL at 22:08

## 2023-03-07 RX ADMIN — Medication 20 ML: at 22:00

## 2023-03-07 RX ADMIN — SODIUM CHLORIDE, PRESERVATIVE FREE 10 ML: 5 INJECTION INTRAVENOUS at 22:00

## 2023-03-07 RX ADMIN — POTASSIUM CHLORIDE 10 MEQ: 7.46 INJECTION, SOLUTION INTRAVENOUS at 22:08

## 2023-03-07 NOTE — Clinical Note
Vessel: bilateral AA w/ RunoffiliacAA w/ Runoffiliac, CFA, PFA, SFA, popliteal, PTA, peroneal, DANIAL and dorsalis pedis. Hand injection to the artery. Multiple views taken.

## 2023-03-07 NOTE — PERIOP NOTES
TRANSFER - OUT REPORT:    Verbal report given to [facility], RN(name) on Monica Arriaga  being transferred to [place] [address](unit) for routine post - op       Report consisted of patients Situation, Background, Assessment and   Recommendations(SBAR). Information from the following report(s) SBAR, Procedure Summary, Intake/Output, and MAR was reviewed with the receiving nurse. Opportunity for questions and clarification was provided.       Patient transported with:   Registered Nurse

## 2023-03-07 NOTE — PERIOP NOTES
Dr. Naren Yañez call back, stated he would put in some new orders for repeat H&H in 4 hours, telemetry, and admission order.

## 2023-03-07 NOTE — PERIOP NOTES
Notified Dr. Fabi Fisher with update, he stated to call him back once labs result to determine path forward. He stated, he might want to keep her overnight.

## 2023-03-07 NOTE — PERIOP NOTES
Notified Dr. Cecily Roque about BP and H&H results, no new orders received. He stated to call him back once BMP results.

## 2023-03-07 NOTE — Clinical Note
TRANSFER - OUT REPORT:     Verbal report given to: andrea (at bedside). Report consisted of patient's Situation, Background, Assessment and   Recommendations(SBAR). Opportunity for questions and clarification was provided. Patient transported with a Registered Nurse. Patient transported to: recovery.

## 2023-03-07 NOTE — PERIOP NOTES
Rapid response called. Dr. Clari Ardon ordered stat H&H and BMP. Left groin site, no hematoma noted and dressing remains clean, dry and intact.

## 2023-03-07 NOTE — H&P
VASCULAR history and physical        Subjective:   CHIEF COMPLAINTS:  Left leg angioplasty  PRESENTATION OF ILLNESS:  Thalia Whittington is a 58 y.o. very pleasant woman she had left leg angiogram angioplasty. Patient says left foot feels much better now foot is warm and she is able to walk better. Now the problem is right leg. Patient still complain of numbness all the time of the right foot. Past Medical History:   Diagnosis Date    Arthritis      Family history of ischemic heart disease      Smoker      Swelling       ankles, hands    Trigeminal neuralgia              Past Surgical History:   Procedure Laterality Date    HX GASTRIC BYPASS   1991    HX HYSTERECTOMY   2001            Family History   Problem Relation Age of Onset    Heart Disease Mother      Heart Attack Father        Social History            Tobacco Use    Smoking status: Every Day       Packs/day: 0.25       Years: 10.00       Pack years: 2.50       Types: Cigarettes    Smokeless tobacco: Never   Substance Use Topics    Alcohol use: Never               Prior to Admission medications    Medication Sig Start Date End Date Taking? Authorizing Provider   clopidogreL (Plavix) 75 mg tab Take 1 Tablet by mouth daily. 1/10/23   Yes Ray, Destinee Solorzano MD   OXcarbazepine (TRILEPTAL) 150 mg tablet Take 600 mg by mouth nightly. Indications: facial nerve pain     Yes Provider, Historical   hydroCHLOROthiazide (HYDRODIURIL) 25 mg tablet Take 25 mg by mouth daily. 11/18/22   Yes Provider, Historical   diclofenac EC (VOLTAREN) 75 mg EC tablet Take 75 mg by mouth two (2) times a day. 11/18/22   Yes Provider, Historical   cyanocobalamin 1,000 mcg tablet Take 1,000 mcg by mouth daily. Yes Provider, Historical   OXcarbazepine (TRILEPTAL) 150 mg tablet Take 450 mg by mouth Every morning.  Indications: facial nerve pain 5/26/21   Yes Provider, Historical           Allergies   Allergen Reactions    Tramadol Hives and Itching         Review of Systems:  I reviewed the rest of organ systems personally and they were negative signed by Dr. Loretta Lewis     Objective:   Visit Vitals  /69 (BP 1 Location: Left upper arm, BP Patient Position: Sitting, BP Cuff Size: Adult long)   Pulse 74   Temp 97.3 °F (36.3 °C) (Temporal)   Ht 5' 4\" (1.626 m)   Wt 201 lb (91.2 kg)   SpO2 98%   BMI 34.50 kg/m²      VITAL SIGNS REVIEWED. Physical Exam:  Patient is well-nourished pleasant in conversation is appropriate. Head and neck examination atraumatic, normocephalic. Gaze appropriate. Conversation appropriate. Neck examination shows supple. No mass. No obvious carotid bruit. Chest examination shows lungs are clear bilaterally well-expanded, no crackles or wheezes. Cardiovascular system regular rate, no obvious murmur. Skin warm to touch  and moist, no skin lesions. Abdomen is soft ,not tender or distended bowel sounds present. No palpable mass. Neurological examinations, no focal neuro deficits moving all 4 extremities. Cranial nerves intact. Sensation is intact as well. Hematologic: No obvious bruise or swelling or obvious lymphadenopathy. Psychosocial: Appropriate. Has good effect. Musculoskeletal system: No muscle wasting, appropriate movements upper and lower extremity. Vascular examination: Patient has a palpable PT on the left foot.            Data Review:           Hospital Outpatient Visit on 01/09/2023   Component Date Value Ref Range Status    WBC 01/09/2023 4.9  3.6 - 11.0 K/uL Final    RBC 01/09/2023 4.05  3.80 - 5.20 M/uL Final    HGB 01/09/2023 11.1 (A)  11.5 - 16.0 g/dL Final    HCT 01/09/2023 32.9 (A)  35.0 - 47.0 % Final    MCV 01/09/2023 81.2  80.0 - 99.0 FL Final    MCH 01/09/2023 27.4  26.0 - 34.0 PG Final    MCHC 01/09/2023 33.7  30.0 - 36.5 g/dL Final    RDW 01/09/2023 13.2  11.5 - 14.5 % Final    PLATELET 00/03/0042 488  150 - 400 K/uL Final    MPV 01/09/2023 8.6 (A)  8.9 - 12.9 FL Final    NRBC 01/09/2023 0.0  0.0  WBC Final    ABSOLUTE NRBC 01/09/2023 0.00  0.00 - 0.01 K/uL Final    Sodium 01/09/2023 134 (A)  136 - 145 mmol/L Final    Potassium 01/09/2023 3.5  3.5 - 5.1 mmol/L Final    Chloride 01/09/2023 98  97 - 108 mmol/L Final    CO2 01/09/2023 30  21 - 32 mmol/L Final    Anion gap 01/09/2023 6  5 - 15 mmol/L Final    Glucose 01/09/2023 89  65 - 100 mg/dL Final    BUN 01/09/2023 9  6 - 20 mg/dL Final    Creatinine 01/09/2023 0.75  0.55 - 1.02 mg/dL Final    BUN/Creatinine ratio 01/09/2023 12  12 - 20   Final    eGFR 01/09/2023 >60  >60 ml/min/1.73m2 Final    Calcium 01/09/2023 9.0  8.5 - 10.1 mg/dL Final    Bilirubin, total 01/09/2023 0.3  0.2 - 1.0 mg/dL Final    AST (SGOT) 01/09/2023 10 (A)  15 - 37 U/L Final    ALT (SGPT) 01/09/2023 18  12 - 78 U/L Final    Alk. phosphatase 01/09/2023 100  45 - 117 U/L Final    Protein, total 01/09/2023 7.0  6.4 - 8.2 g/dL Final    Albumin 01/09/2023 3.5  3.5 - 5.0 g/dL Final    Globulin 01/09/2023 3.5  2.0 - 4.0 g/dL Final    A-G Ratio 01/09/2023 1.0 (A)  1.1 - 2.2   Final    Prothrombin time 01/09/2023 13.2  11.9 - 14.6 sec Final    INR 01/09/2023 1.0  0.9 - 1.1   Final    aPTT 01/09/2023 26.3  21.2 - 34.1 sec Final    aPTT, therapeutic range 01/09/2023    82 - 109 sec Final         Assessment:      Problem List Items Addressed This Visit                  Circulatory     PAD (peripheral artery disease) ([facility] [address] - Primary            Plan:      Patient has excellent results on left leg. And we will schedule for right leg angiogram angioplasty on the March 7, 2023. Patient was discussed rational for the procedure, risks benefits and complication.

## 2023-03-08 ENCOUNTER — APPOINTMENT (OUTPATIENT)
Dept: CT IMAGING | Age: 63
End: 2023-03-08
Attending: SURGERY
Payer: MEDICAID

## 2023-03-08 VITALS
DIASTOLIC BLOOD PRESSURE: 59 MMHG | RESPIRATION RATE: 20 BRPM | OXYGEN SATURATION: 100 % | TEMPERATURE: 98.1 F | SYSTOLIC BLOOD PRESSURE: 124 MMHG | HEART RATE: 78 BPM

## 2023-03-08 LAB
ABO + RH BLD: NORMAL
ALBUMIN SERPL-MCNC: 2.7 G/DL (ref 3.5–5)
ALBUMIN/GLOB SERPL: 0.9 (ref 1.1–2.2)
ALP SERPL-CCNC: 72 U/L (ref 45–117)
ALT SERPL-CCNC: 13 U/L (ref 12–78)
ANION GAP SERPL CALC-SCNC: 3 MMOL/L (ref 5–15)
ANION GAP SERPL CALC-SCNC: 4 MMOL/L (ref 5–15)
AST SERPL W P-5'-P-CCNC: 9 U/L (ref 15–37)
BASOPHILS # BLD: 0 K/UL (ref 0–0.1)
BASOPHILS # BLD: 0 K/UL (ref 0–0.1)
BASOPHILS NFR BLD: 0 % (ref 0–1)
BASOPHILS NFR BLD: 0 % (ref 0–1)
BILIRUB SERPL-MCNC: 0.3 MG/DL (ref 0.2–1)
BLOOD GROUP ANTIBODIES SERPL: NEGATIVE
BUN SERPL-MCNC: 10 MG/DL (ref 6–20)
BUN SERPL-MCNC: 9 MG/DL (ref 6–20)
BUN/CREAT SERPL: 14 (ref 12–20)
BUN/CREAT SERPL: 17 (ref 12–20)
CA-I BLD-MCNC: 7.9 MG/DL (ref 8.5–10.1)
CA-I BLD-MCNC: 8.1 MG/DL (ref 8.5–10.1)
CHLORIDE SERPL-SCNC: 97 MMOL/L (ref 97–108)
CHLORIDE SERPL-SCNC: 99 MMOL/L (ref 97–108)
CO2 SERPL-SCNC: 26 MMOL/L (ref 21–32)
CO2 SERPL-SCNC: 27 MMOL/L (ref 21–32)
CREAT SERPL-MCNC: 0.59 MG/DL (ref 0.55–1.02)
CREAT SERPL-MCNC: 0.66 MG/DL (ref 0.55–1.02)
DIFFERENTIAL METHOD BLD: ABNORMAL
DIFFERENTIAL METHOD BLD: ABNORMAL
EOSINOPHIL # BLD: 0 K/UL (ref 0–0.4)
EOSINOPHIL # BLD: 0 K/UL (ref 0–0.4)
EOSINOPHIL NFR BLD: 0 % (ref 0–7)
EOSINOPHIL NFR BLD: 0 % (ref 0–7)
ERYTHROCYTE [DISTWIDTH] IN BLOOD BY AUTOMATED COUNT: 12.7 % (ref 11.5–14.5)
ERYTHROCYTE [DISTWIDTH] IN BLOOD BY AUTOMATED COUNT: 12.9 % (ref 11.5–14.5)
GLOBULIN SER CALC-MCNC: 3.1 G/DL (ref 2–4)
GLUCOSE BLD STRIP.AUTO-MCNC: 101 MG/DL (ref 65–100)
GLUCOSE BLD STRIP.AUTO-MCNC: 101 MG/DL (ref 65–100)
GLUCOSE BLD STRIP.AUTO-MCNC: 123 MG/DL (ref 65–100)
GLUCOSE SERPL-MCNC: 114 MG/DL (ref 65–100)
GLUCOSE SERPL-MCNC: 126 MG/DL (ref 65–100)
HCT VFR BLD AUTO: 22.6 % (ref 35–47)
HCT VFR BLD AUTO: 23.1 % (ref 35–47)
HGB BLD-MCNC: 7.9 G/DL (ref 11.5–16)
HGB BLD-MCNC: 8.1 G/DL (ref 11.5–16)
IMM GRANULOCYTES # BLD AUTO: 0 K/UL (ref 0–0.04)
IMM GRANULOCYTES # BLD AUTO: 0 K/UL (ref 0–0.04)
IMM GRANULOCYTES NFR BLD AUTO: 0 % (ref 0–0.5)
IMM GRANULOCYTES NFR BLD AUTO: 1 % (ref 0–0.5)
LYMPHOCYTES # BLD: 1.4 K/UL (ref 0.8–3.5)
LYMPHOCYTES # BLD: 1.4 K/UL (ref 0.8–3.5)
LYMPHOCYTES NFR BLD: 18 % (ref 12–49)
LYMPHOCYTES NFR BLD: 21 % (ref 12–49)
MCH RBC QN AUTO: 27.5 PG (ref 26–34)
MCH RBC QN AUTO: 27.6 PG (ref 26–34)
MCHC RBC AUTO-ENTMCNC: 35 G/DL (ref 30–36.5)
MCHC RBC AUTO-ENTMCNC: 35.1 G/DL (ref 30–36.5)
MCV RBC AUTO: 78.6 FL (ref 80–99)
MCV RBC AUTO: 78.7 FL (ref 80–99)
MONOCYTES # BLD: 0.4 K/UL (ref 0–1)
MONOCYTES # BLD: 0.5 K/UL (ref 0–1)
MONOCYTES NFR BLD: 6 % (ref 5–13)
MONOCYTES NFR BLD: 6 % (ref 5–13)
MRSA DNA SPEC QL NAA+PROBE: NOT DETECTED
NEUTS SEG # BLD: 4.8 K/UL (ref 1.8–8)
NEUTS SEG # BLD: 5.6 K/UL (ref 1.8–8)
NEUTS SEG NFR BLD: 72 % (ref 32–75)
NEUTS SEG NFR BLD: 76 % (ref 32–75)
NRBC # BLD: 0 K/UL (ref 0–0.01)
NRBC # BLD: 0 K/UL (ref 0–0.01)
NRBC BLD-RTO: 0 PER 100 WBC
NRBC BLD-RTO: 0 PER 100 WBC
PERFORMED BY, TECHID: ABNORMAL
PLATELET # BLD AUTO: 264 K/UL (ref 150–400)
PLATELET # BLD AUTO: 276 K/UL (ref 150–400)
PMV BLD AUTO: 8.8 FL (ref 8.9–12.9)
PMV BLD AUTO: 9.1 FL (ref 8.9–12.9)
POTASSIUM SERPL-SCNC: 3.8 MMOL/L (ref 3.5–5.1)
POTASSIUM SERPL-SCNC: 4.3 MMOL/L (ref 3.5–5.1)
PROT SERPL-MCNC: 5.8 G/DL (ref 6.4–8.2)
RBC # BLD AUTO: 2.87 M/UL (ref 3.8–5.2)
RBC # BLD AUTO: 2.94 M/UL (ref 3.8–5.2)
SODIUM SERPL-SCNC: 127 MMOL/L (ref 136–145)
SODIUM SERPL-SCNC: 129 MMOL/L (ref 136–145)
SPECIMEN EXP DATE BLD: NORMAL
WBC # BLD AUTO: 6.6 K/UL (ref 3.6–11)
WBC # BLD AUTO: 7.5 K/UL (ref 3.6–11)

## 2023-03-08 PROCEDURE — 74011250637 HC RX REV CODE- 250/637: Performed by: SURGERY

## 2023-03-08 PROCEDURE — 74011636637 HC RX REV CODE- 636/637: Performed by: SURGERY

## 2023-03-08 PROCEDURE — 80053 COMPREHEN METABOLIC PANEL: CPT

## 2023-03-08 PROCEDURE — 74011250636 HC RX REV CODE- 250/636: Performed by: SURGERY

## 2023-03-08 PROCEDURE — 85025 COMPLETE CBC W/AUTO DIFF WBC: CPT

## 2023-03-08 PROCEDURE — 74011000250 HC RX REV CODE- 250: Performed by: SURGERY

## 2023-03-08 PROCEDURE — 74011250636 HC RX REV CODE- 250/636: Performed by: INTERNAL MEDICINE

## 2023-03-08 PROCEDURE — 36415 COLL VENOUS BLD VENIPUNCTURE: CPT

## 2023-03-08 PROCEDURE — 82962 GLUCOSE BLOOD TEST: CPT

## 2023-03-08 PROCEDURE — G0378 HOSPITAL OBSERVATION PER HR: HCPCS

## 2023-03-08 PROCEDURE — 80048 BASIC METABOLIC PNL TOTAL CA: CPT

## 2023-03-08 RX ADMIN — POTASSIUM CHLORIDE 10 MEQ: 7.46 INJECTION, SOLUTION INTRAVENOUS at 00:08

## 2023-03-08 RX ADMIN — CLOPIDOGREL BISULFATE 75 MG: 75 TABLET ORAL at 08:21

## 2023-03-08 RX ADMIN — Medication 20 ML: at 14:00

## 2023-03-08 RX ADMIN — Medication 10 ML: at 06:00

## 2023-03-08 RX ADMIN — SODIUM CHLORIDE, PRESERVATIVE FREE 10 ML: 5 INJECTION INTRAVENOUS at 08:22

## 2023-03-08 RX ADMIN — SODIUM CHLORIDE, PRESERVATIVE FREE 10 ML: 5 INJECTION INTRAVENOUS at 08:27

## 2023-03-08 RX ADMIN — SODIUM CHLORIDE, PRESERVATIVE FREE 10 ML: 5 INJECTION INTRAVENOUS at 14:00

## 2023-03-08 RX ADMIN — ENOXAPARIN SODIUM 40 MG: 100 INJECTION SUBCUTANEOUS at 08:21

## 2023-03-08 NOTE — PROGRESS NOTES
Consult for PIV however, when I arrived to the bedside the primary RN stated that the patient was no longer getting a blood transfusion and that the vascular access consult could be discontinued.

## 2023-03-08 NOTE — PROGRESS NOTES
State Observation Notice/Outpatient Observation Notice (Anthony Alfredo) provided to patient/representative with verbal explanation of the notice. Time allotted for questions regarding the notice. Patient /representative provided a completed copy of the SON/VOON notice. Copy placed on bedside chart.         Ama Jimenez, JANUARY

## 2023-03-08 NOTE — PERIOP NOTES
Notified Dr. Roverto Casanova of BMP results, he stated he was in hospital and would come by to see patient.

## 2023-03-08 NOTE — CONSULTS
Internal Medicine Initial Consult Note    Patient: Michel Sterling MRN: [id number]  SSN: [id number]    YOB: 1960  Age: 58 y.o. Sex: female      Subjective:      Michel Sterling is a 58 y.o. female with PMH of PVD, VTE, and other medical problems as below. She was brought to the ICU after a rapid response in the Same day service after right sided angioplasty earlier today. She was noted to have hypotension after surgery and was having diaphoresis. She reports have been keeping NPO since yesterday midnight and had not had anything to eat since. After she was given a sandwich post-procedure, she developed the symptoms. After transferred to the ICU, her BP has been stabilized and her symptoms resolved. Currently she reports no active symptoms, no pain or swelling at the procedure site. She reports low salt diet normally. No history of CHF, liver disease or CKD. IM service consulted for hypokalemia and hyponatremia. Na 124 prior to surgery and 122 after surgery. 1 L NS has since given, recheck pending. K after surgery was 2.9. Chart review: none    Past Medical History:   Diagnosis Date    Arthritis     Family history of ischemic heart disease     PVD (peripheral vascular disease) ([facility] [address]     Smoker     Swelling     ankles, hands    Thromboembolus ([facility])     left leg    Trigeminal neuralgia      Family History   Problem Relation Age of Onset    Heart Disease Mother     Heart Attack Father      Social History     Tobacco Use    Smoking status: Every Day     Packs/day: 0.25     Years: 10.00     Pack years: 2.50     Types: Cigarettes    Smokeless tobacco: Never   Substance Use Topics    Alcohol use: Never        Objective:     Physical Exam:   General: alert, cooperative, no distress  Eye: conjunctivae/corneas clear. PERRL, EOM's intact. Throat and Neck: normal and no erythema or exudates noted.  No mass   Lung: clear to auscultation bilaterally  Heart: regular rate and rhythm,   Abdomen: soft, non-tender. Bowel sounds normal. No masses,  Extremities: No LE edema. able to move all extremities normal, atraumatic  Skin: Normal.  Neurologic: AOx3. Motor function and sensation grossly intact. Psychiatric: non focal    Most recent lab work and imaging results reviewed in EMR. Assessment and plan:   # Hyponatremia  - Suspect secondary to dehydration secondary to prolonged fasting.   - Will recheck Na now, after 1L of NS.   - If Na persistently low, can consider 3% NS with close monitoring of Na level. # Hyperkalemia  - PO and IV K replacement ordered. - Recheck in AM.    # Hypotensive  - Transient and resolved. Given timing, suspect vasovagal pre-syncope. Ddx: acute anemia, hypoglycemia. - Vital signs stable for now, will monitor     # Acute anemia  - Noted post-op hemoglobin 9.0, decreased from 11 at baseline, possibly secondary to angioplasty. Will monitor hemoglobin. Given vital signs stable for now, do not feel the urgency to transfuse pRBC now, however would recommended PO iron supplement on discharge. # Peripheral artery disease  - S/p angioplasty bilaterally. Management per Dr. Keyana Bower, surgery. # Social Determents of health: None    # Full code by default, need further clarification    # Medication reconciliation: Medication list reviewed on Epic and with patient/family.      Signed By: Lia Ennis MD     March 7, 2023

## 2023-03-08 NOTE — PROGRESS NOTES
1930 Received patient as a Rapid Response from Same Day Surgery. A general bedside report given at this time, report received from ELICEO Chinchilla. Dr. Fabi Fisher is on unit and has place orders and given verbal orders. Orders in. Dr. Myra King consulted as Hospitalist for patient for electrolytes replacements and monitoring of hypotension and lab.    2100 Dr. Myra King continues on unit placing orders and requesting labs. 2200 Runs of K+ continues. 2300 Last run of K+     2400 Q4hr labs drawn and sent. Awaiting results to call to Dr. Myra King    0200 Incontinent episode with PW. Cleaned up again. 0330 Late entry due to system downtime. Spoke with Dr. Myra King. Orders to stop IV fluids and give the 2u PRBs as ordered as the patient only has one IV access with 4 other co-workers attempting to get IV site. 1 unit of PRBC released. 0400 Labs drawn per order, new order came in to have stat CTA of Abdomen with/without contrast. CT called, Lab called to hold off on sending PRBC as the patient was heading to CT.    0530 Back from CT which was incomplete as there was only one CT scan available and the one IV site was leaking just before the injection of dye. CT tech to write note. Once returned from [address], Dr. Fabi Fisher called to get update.  Was updated on RBCs not yet infused because of the one site and the other orders took precedent since he wanted electrolytes corrected also with guidance from hospitalist, Dr. Myra King.    7481 Report given to Mitra, [address] [facility]

## 2023-03-13 ENCOUNTER — OFFICE VISIT (OUTPATIENT)
Dept: SURGERY | Age: 63
End: 2023-03-13

## 2023-03-13 VITALS
SYSTOLIC BLOOD PRESSURE: 141 MMHG | HEART RATE: 83 BPM | DIASTOLIC BLOOD PRESSURE: 72 MMHG | BODY MASS INDEX: 35.34 KG/M2 | TEMPERATURE: 97.4 F | OXYGEN SATURATION: 94 % | WEIGHT: 207 LBS | HEIGHT: 64 IN

## 2023-03-13 DIAGNOSIS — I73.9 PAD (PERIPHERAL ARTERY DISEASE) (HCC): Primary | ICD-10-CM

## 2023-03-13 NOTE — PROGRESS NOTES
Identified pt with two pt identifiers (name and ). Reviewed chart in preparation for visit and have obtained necessary documentation. Warren Sosa is a 58 y.o. female  Chief Complaint   Patient presents with    Post OP Follow Up     angio     Visit Vitals  BP (!) 141/72 (BP 1 Location: Right upper arm, BP Patient Position: Sitting, BP Cuff Size: Large adult)   Pulse 83   Temp 97.4 °F (36.3 °C) (Temporal)   Ht 5' 4\" (1.626 m)   Wt 207 lb (93.9 kg)   SpO2 94%   BMI 35.53 kg/m²       1. Have you been to the ER, urgent care clinic since your last visit? Hospitalized since your last visit? No    2. Have you seen or consulted any other health care providers outside of the [address] [place] [address] since your last visit? Include any pap smears or colon screening. No    Patient and provider made aware of elevated BP x2. Patient asymptomatic. Patient reminded to monitor BP, continue to take BP medications if prescribed, and follow up with PCP/Cardiologist.  Patient expressed understanding and agreement.

## 2023-03-19 NOTE — PROGRESS NOTES
VASCULAR FOLLOW UP      Subjective:   CHIEF COMPLAINTS:  Right leg angioplasty  PRESENTATION OF ILLNESS:  Sloane Westbrook is a 58 y.o. very pleasant woman is here today for follow-up right leg angioplasty. Patient symptoms on the right foot has a significant improved after successful angioplasty, patient had a 90% distal SFA occlusion this was successfully treated. Patient currently on Pletal therapy as well as Plavix. Patient denies chest pain shortness of breath. Denies any worsening swelling on the right leg. Past Medical History:   Diagnosis Date    Arthritis     Family history of ischemic heart disease     PVD (peripheral vascular disease) ([address]     Smoker     Swelling     ankles, hands    Thromboembolus (HCC)     left leg    Trigeminal neuralgia       Past Surgical History:   Procedure Laterality Date    HX GASTRIC BYPASS  1991    HX HYSTERECTOMY  2001    HX OTHER SURGICAL Left 01/2023    angiogram with intervention     Family History   Problem Relation Age of Onset    Heart Disease Mother     Heart Attack Father       Social History     Tobacco Use    Smoking status: Every Day     Packs/day: 0.25     Years: 10.00     Pack years: 2.50     Types: Cigarettes    Smokeless tobacco: Never   Substance Use Topics    Alcohol use: Never       Prior to Admission medications    Medication Sig Start Date End Date Taking? Authorizing Provider   cilostazoL (PLETAL) 50 mg tablet Take 1 Tablet by mouth Before breakfast and dinner for 30 days. 3/7/23 4/6/23 Yes Sridevi Angela MD   clopidogreL (Plavix) 75 mg tab Take 1 Tablet by mouth daily. 1/10/23  Yes Newton Bell MD   OXcarbazepine (TRILEPTAL) 150 mg tablet Take 600 mg by mouth nightly. Indications: facial nerve pain   Yes Provider, Historical   hydroCHLOROthiazide (HYDRODIURIL) 25 mg tablet Take 25 mg by mouth daily. 11/18/22  Yes Provider, Historical   diclofenac EC (VOLTAREN) 75 mg EC tablet Take 75 mg by mouth two (2) times a day.  11/18/22  Yes Provider, Historical   OXcarbazepine (TRILEPTAL) 150 mg tablet Take 450 mg by mouth Every morning. Indications: facial nerve pain 5/26/21  Yes Provider, Historical     Allergies   Allergen Reactions    Tramadol Hives and Itching        Review of Systems:  I reviewed the rest of organ systems personally and they were negative signed by Dr. Atif Dow    Objective:   Visit Vitals  BP (!) 141/72 (BP 1 Location: Right upper arm, BP Patient Position: Sitting, BP Cuff Size: Large adult)   Pulse 83   Temp 97.4 °F (36.3 °C) (Temporal)   Ht 5' 4\" (1.626 m)   Wt 207 lb (93.9 kg)   SpO2 94%   BMI 35.53 kg/m²     VITAL SIGNS REVIEWED. Physical Exam:  Patient is well-nourished pleasant in conversation is appropriate. Head and neck examination atraumatic, normocephalic. Gaze appropriate. Conversation appropriate. Neck examination shows supple. No mass. No obvious carotid bruit. Chest examination shows lungs are clear bilaterally well-expanded, no crackles or wheezes. Cardiovascular system regular rate, no obvious murmur. Skin warm to touch  and moist, no skin lesions. Abdomen is soft ,not tender or distended bowel sounds present. No palpable mass. Neurological examinations, no focal neuro deficits moving all 4 extremities. Cranial nerves intact. Sensation is intact as well. Hematologic: No obvious bruise or swelling or obvious lymphadenopathy. Psychosocial: Appropriate. Has good effect. Musculoskeletal system: No muscle wasting, appropriate movements upper and lower extremity. Vascular examination: Vascular examination shows biphasic signal noted right PT and monophasic right DP on the left foot has biphasic on PT and monophasic on DP.         Data Review:   Admission on 03/07/2023, Discharged on 03/08/2023   Component Date Value Ref Range Status    Glucose (POC) 03/07/2023 349 (A)  65 - 100 mg/dL Final    Performed by 03/07/2023 Emiliano Vega   Final    HGB 03/07/2023 9.0 (A)  11.5 - 16.0 g/dL Final    HCT 03/07/2023 25.9 (A)  35.0 - 47.0 % Final    Sodium 03/07/2023 122 (A)  136 - 145 mmol/L Final    Potassium 03/07/2023 2.9 (A)  3.5 - 5.1 mmol/L Final    Chloride 03/07/2023 93 (A)  97 - 108 mmol/L Final    CO2 03/07/2023 23  21 - 32 mmol/L Final    Anion gap 03/07/2023 6  5 - 15 mmol/L Final    Glucose 03/07/2023 269 (A)  65 - 100 mg/dL Final    BUN 03/07/2023 13  6 - 20 mg/dL Final    Creatinine 03/07/2023 0.79  0.55 - 1.02 mg/dL Final    BUN/Creatinine ratio 03/07/2023 16  12 - 20   Final    eGFR 03/07/2023 >60  >60 ml/min/1.73m2 Final    Calcium 03/07/2023 7.6 (A)  8.5 - 10.1 mg/dL Final    HGB 03/07/2023 8.9 (A)  11.5 - 16.0 g/dL Final    HCT 03/07/2023 25.6 (A)  35.0 - 47.0 % Final    Glucose (POC) 03/07/2023 152 (A)  65 - 100 mg/dL Final    Performed by 03/07/2023 Patti Jackson   Final    Crossmatch Expiration 03/07/2023 03/10/2023,2359   Final    ABO/Rh(D) 03/07/2023 B Positive   Final    Antibody screen 03/07/2023 Negative   Final    Sodium 03/07/2023 126 (A)  136 - 145 mmol/L Final    Potassium 03/07/2023 3.3 (A)  3.5 - 5.1 mmol/L Final    Chloride 03/07/2023 94 (A)  97 - 108 mmol/L Final    CO2 03/07/2023 27  21 - 32 mmol/L Final    Anion gap 03/07/2023 5  5 - 15 mmol/L Final    Glucose 03/07/2023 92  65 - 100 mg/dL Final    BUN 03/07/2023 12  6 - 20 mg/dL Final    Creatinine 03/07/2023 0.57  0.55 - 1.02 mg/dL Final    BUN/Creatinine ratio 03/07/2023 21 (A)  12 - 20   Final    eGFR 03/07/2023 >60  >60 ml/min/1.73m2 Final    Calcium 03/07/2023 7.9 (A)  8.5 - 10.1 mg/dL Final    MRSA by PCR, Nasal 03/07/2023 Not Detected  Not Detected   Final    Sodium 03/08/2023 127 (A)  136 - 145 mmol/L Final    Potassium 03/08/2023 3.8  3.5 - 5.1 mmol/L Final    Chloride 03/08/2023 97  97 - 108 mmol/L Final    CO2 03/08/2023 27  21 - 32 mmol/L Final    Anion gap 03/08/2023 3 (A)  5 - 15 mmol/L Final    Glucose 03/08/2023 126 (A)  65 - 100 mg/dL Final    BUN 03/08/2023 10  6 - 20 mg/dL Final    Creatinine 03/08/2023 0.59  0.55 - 1.02 mg/dL Final    BUN/Creatinine ratio 03/08/2023 17  12 - 20   Final    eGFR 03/08/2023 >60  >60 ml/min/1.73m2 Final    Calcium 03/08/2023 7.9 (A)  8.5 - 10.1 mg/dL Final    WBC 03/08/2023 7.5  3.6 - 11.0 K/uL Final    RBC 03/08/2023 2.87 (A)  3.80 - 5.20 M/uL Final    HGB 03/08/2023 7.9 (A)  11.5 - 16.0 g/dL Final    HCT 03/08/2023 22.6 (A)  35.0 - 47.0 % Final    MCV 03/08/2023 78.7 (A)  80.0 - 99.0 FL Final    MCH 03/08/2023 27.5  26.0 - 34.0 PG Final    MCHC 03/08/2023 35.0  30.0 - 36.5 g/dL Final    RDW 03/08/2023 12.7  11.5 - 14.5 % Final    PLATELET 74/21/8873 670  150 - 400 K/uL Final    MPV 03/08/2023 8.8 (A)  8.9 - 12.9 FL Final    NRBC 03/08/2023 0.0  0.0  WBC Final    ABSOLUTE NRBC 03/08/2023 0.00  0.00 - 0.01 K/uL Final    NEUTROPHILS 03/08/2023 76 (A)  32 - 75 % Final    LYMPHOCYTES 03/08/2023 18  12 - 49 % Final    MONOCYTES 03/08/2023 6  5 - 13 % Final    EOSINOPHILS 03/08/2023 0  0 - 7 % Final    BASOPHILS 03/08/2023 0  0 - 1 % Final    IMMATURE GRANULOCYTES 03/08/2023 0  0 - 0.5 % Final    ABS. NEUTROPHILS 03/08/2023 5.6  1.8 - 8.0 K/UL Final    ABS. LYMPHOCYTES 03/08/2023 1.4  0.8 - 3.5 K/UL Final    ABS. MONOCYTES 03/08/2023 0.5  0.0 - 1.0 K/UL Final    ABS. EOSINOPHILS 03/08/2023 0.0  0.0 - 0.4 K/UL Final    ABS. BASOPHILS 03/08/2023 0.0  0.0 - 0.1 K/UL Final    ABS. IMM.  GRANS. 03/08/2023 0.0  0.00 - 0.04 K/UL Final    DF 03/08/2023 AUTOMATED    Final    WBC 03/08/2023 6.6  3.6 - 11.0 K/uL Final    RBC 03/08/2023 2.94 (A)  3.80 - 5.20 M/uL Final    HGB 03/08/2023 8.1 (A)  11.5 - 16.0 g/dL Final    HCT 03/08/2023 23.1 (A)  35.0 - 47.0 % Final    MCV 03/08/2023 78.6 (A)  80.0 - 99.0 FL Final    MCH 03/08/2023 27.6  26.0 - 34.0 PG Final    MCHC 03/08/2023 35.1  30.0 - 36.5 g/dL Final    RDW 03/08/2023 12.9  11.5 - 14.5 % Final    PLATELET 15/11/6284 381  150 - 400 K/uL Final    MPV 03/08/2023 9.1  8.9 - 12.9 FL Final    NRBC 03/08/2023 0.0  0.0  WBC Final    ABSOLUTE NRBC 03/08/2023 0. 00  0.00 - 0.01 K/uL Final    NEUTROPHILS 03/08/2023 72  32 - 75 % Final    LYMPHOCYTES 03/08/2023 21  12 - 49 % Final    MONOCYTES 03/08/2023 6  5 - 13 % Final    EOSINOPHILS 03/08/2023 0  0 - 7 % Final    BASOPHILS 03/08/2023 0  0 - 1 % Final    IMMATURE GRANULOCYTES 03/08/2023 1 (A)  0 - 0.5 % Final    ABS. NEUTROPHILS 03/08/2023 4.8  1.8 - 8.0 K/UL Final    ABS. LYMPHOCYTES 03/08/2023 1.4  0.8 - 3.5 K/UL Final    ABS. MONOCYTES 03/08/2023 0.4  0.0 - 1.0 K/UL Final    ABS. EOSINOPHILS 03/08/2023 0.0  0.0 - 0.4 K/UL Final    ABS. BASOPHILS 03/08/2023 0.0  0.0 - 0.1 K/UL Final    ABS. IMM. GRANS. 03/08/2023 0.0  0.00 - 0.04 K/UL Final    DF 03/08/2023 AUTOMATED    Final    Sodium 03/08/2023 129 (A)  136 - 145 mmol/L Final    Potassium 03/08/2023 4.3  3.5 - 5.1 mmol/L Final    Chloride 03/08/2023 99  97 - 108 mmol/L Final    CO2 03/08/2023 26  21 - 32 mmol/L Final    Anion gap 03/08/2023 4 (A)  5 - 15 mmol/L Final    Glucose 03/08/2023 114 (A)  65 - 100 mg/dL Final    BUN 03/08/2023 9  6 - 20 mg/dL Final    Creatinine 03/08/2023 0.66  0.55 - 1.02 mg/dL Final    BUN/Creatinine ratio 03/08/2023 14  12 - 20   Final    eGFR 03/08/2023 >60  >60 ml/min/1.73m2 Final    Calcium 03/08/2023 8.1 (A)  8.5 - 10.1 mg/dL Final    Bilirubin, total 03/08/2023 0.3  0.2 - 1.0 mg/dL Final    AST (SGOT) 03/08/2023 9 (A)  15 - 37 U/L Final    ALT (SGPT) 03/08/2023 13  12 - 78 U/L Final    Alk.  phosphatase 03/08/2023 72  45 - 117 U/L Final    Protein, total 03/08/2023 5.8 (A)  6.4 - 8.2 g/dL Final    Albumin 03/08/2023 2.7 (A)  3.5 - 5.0 g/dL Final    Globulin 03/08/2023 3.1  2.0 - 4.0 g/dL Final    A-G Ratio 03/08/2023 0.9 (A)  1.1 - 2.2   Final    Glucose (POC) 03/08/2023 101 (A)  65 - 100 mg/dL Final    Performed by 03/08/2023 Elizabeth Thomas   Final    Glucose (POC) 03/08/2023 123 (A)  65 - 100 mg/dL Final    Performed by 03/08/2023 Elizabeth Thomas   Final    Glucose (POC) 03/08/2023 101 (A)  65 - 100 mg/dL Final    Performed by 03/08/2023 [facility] Outpatient Visit on 03/06/2023   Component Date Value Ref Range Status    WBC 03/06/2023 4.9  3.6 - 11.0 K/uL Final    RBC 03/06/2023 4.18  3.80 - 5.20 M/uL Final    HGB 03/06/2023 11.3 (A)  11.5 - 16.0 g/dL Final    HCT 03/06/2023 33.0 (A)  35.0 - 47.0 % Final    MCV 03/06/2023 78.9 (A)  80.0 - 99.0 FL Final    MCH 03/06/2023 27.0  26.0 - 34.0 PG Final    MCHC 03/06/2023 34.2  30.0 - 36.5 g/dL Final    RDW 03/06/2023 13.0  11.5 - 14.5 % Final    PLATELET 87/61/0768 365  150 - 400 K/uL Final    MPV 03/06/2023 8.8 (A)  8.9 - 12.9 FL Final    NRBC 03/06/2023 0.0  0.0  WBC Final    ABSOLUTE NRBC 03/06/2023 0.00  0.00 - 0.01 K/uL Final    NEUTROPHILS 03/06/2023 60  32 - 75 % Final    LYMPHOCYTES 03/06/2023 32  12 - 49 % Final    MONOCYTES 03/06/2023 7  5 - 13 % Final    EOSINOPHILS 03/06/2023 1  0 - 7 % Final    BASOPHILS 03/06/2023 0  0 - 1 % Final    IMMATURE GRANULOCYTES 03/06/2023 0  0 - 0.5 % Final    ABS. NEUTROPHILS 03/06/2023 2.9  1.8 - 8.0 K/UL Final    ABS. LYMPHOCYTES 03/06/2023 1.6  0.8 - 3.5 K/UL Final    ABS. MONOCYTES 03/06/2023 0.4  0.0 - 1.0 K/UL Final    ABS. EOSINOPHILS 03/06/2023 0.1  0.0 - 0.4 K/UL Final    ABS. BASOPHILS 03/06/2023 0.0  0.0 - 0.1 K/UL Final    ABS. IMM.  GRANS. 03/06/2023 0.0  0.00 - 0.04 K/UL Final    DF 03/06/2023 AUTOMATED    Final    Sodium 03/06/2023 124 (A)  136 - 145 mmol/L Final    Potassium 03/06/2023 3.9  3.5 - 5.1 mmol/L Final    Chloride 03/06/2023 91 (A)  97 - 108 mmol/L Final    CO2 03/06/2023 27  21 - 32 mmol/L Final    Anion gap 03/06/2023 6  5 - 15 mmol/L Final    Glucose 03/06/2023 93  65 - 100 mg/dL Final    BUN 03/06/2023 14  6 - 20 mg/dL Final    Creatinine 03/06/2023 0.71  0.55 - 1.02 mg/dL Final    BUN/Creatinine ratio 03/06/2023 20  12 - 20   Final    eGFR 03/06/2023 >60  >60 ml/min/1.73m2 Final    Calcium 03/06/2023 9.1  8.5 - 10.1 mg/dL Final    Bilirubin, total 03/06/2023 0.2  0.2 - 1.0 mg/dL Final    AST (SGOT) 03/06/2023 12 (A)  15 - 37 U/L Final    ALT (SGPT) 03/06/2023 17  12 - 78 U/L Final    Alk. phosphatase 03/06/2023 96  45 - 117 U/L Final    Protein, total 03/06/2023 7.2  6.4 - 8.2 g/dL Final    Albumin 03/06/2023 3.5  3.5 - 5.0 g/dL Final    Globulin 03/06/2023 3.7  2.0 - 4.0 g/dL Final    A-G Ratio 03/06/2023 0.9 (A)  1.1 - 2.2   Final    Prothrombin time 03/06/2023 13.3  11.9 - 14.6 sec Final    INR 03/06/2023 1.0  0.9 - 1.1   Final    aPTT 03/06/2023 28.8  21.2 - 34.1 sec Final    aPTT, therapeutic range 03/06/2023    82 - 109 sec Final        Assessment:     Problem List Items Addressed This Visit          Circulatory    PAD (peripheral artery disease) ([address] - Primary           Plan:     Patient was informed about today's surveillance vascular examination for follow-up. Patient will continue Pletal as well as Plavix therapy. Patient was discussed again continue optimize risk factor for PAD. Patient will be reassessed in 3 months follow-up.         Pascual Joshua MD

## 2023-03-20 ENCOUNTER — TELEPHONE (OUTPATIENT)
Dept: SURGERY | Age: 63
End: 2023-03-20

## 2023-03-20 NOTE — TELEPHONE ENCOUNTER
iLgia Aparicio called this morning and wanted to let Dr Rommel Leon know that she stopped taking the PLETAL due to negative side effects, bruising calf, swelling and muscle pain.  If needed please call [phone]

## 2023-03-23 NOTE — TELEPHONE ENCOUNTER
Sudhir Herbert would like to see the patient in April. I called and scheduled appointment with Brittny Fabian for April.

## 2023-04-13 PROBLEM — I87.303 CHRONIC VENOUS HYPERTENSION INVOLVING BOTH SIDES: Status: ACTIVE | Noted: 2023-04-13

## 2023-05-05 RX ORDER — CLOPIDOGREL BISULFATE 75 MG/1
TABLET ORAL
Qty: 30 TABLET | Refills: 3 | Status: SHIPPED | OUTPATIENT
Start: 2023-05-05

## 2023-07-08 ENCOUNTER — HOSPITAL ENCOUNTER (EMERGENCY)
Facility: HOSPITAL | Age: 63
Discharge: HOME | End: 2023-07-08
Attending: EMERGENCY MEDICINE
Payer: MEDICAID

## 2023-07-08 VITALS
HEART RATE: 75 BPM | HEIGHT: 64 IN | RESPIRATION RATE: 18 BRPM | DIASTOLIC BLOOD PRESSURE: 75 MMHG | OXYGEN SATURATION: 100 % | TEMPERATURE: 98.3 F | WEIGHT: 198 LBS | BODY MASS INDEX: 33.8 KG/M2 | SYSTOLIC BLOOD PRESSURE: 153 MMHG

## 2023-07-08 DIAGNOSIS — Z76.0 ENCOUNTER FOR MEDICATION REFILL: Primary | ICD-10-CM

## 2023-07-08 PROCEDURE — 99283 EMERGENCY DEPT VISIT LOW MDM: CPT

## 2023-07-08 RX ORDER — OXCARBAZEPINE 150 MG/1
150 TABLET, FILM COATED ORAL 4 TIMES DAILY
Qty: 60 TABLET | Refills: 0 | Status: SHIPPED | OUTPATIENT
Start: 2023-07-08 | End: 2023-07-23

## 2023-07-08 ASSESSMENT — PAIN DESCRIPTION - ORIENTATION: ORIENTATION: RIGHT

## 2023-07-08 ASSESSMENT — PAIN SCALES - GENERAL: PAINLEVEL_OUTOF10: 8

## 2023-07-08 ASSESSMENT — PAIN DESCRIPTION - DESCRIPTORS: DESCRIPTORS: SHOOTING;SHARP

## 2023-07-08 ASSESSMENT — PAIN - FUNCTIONAL ASSESSMENT: PAIN_FUNCTIONAL_ASSESSMENT: 0-10

## 2023-07-08 NOTE — ED TRIAGE NOTES
PCP - has not refilled medication - (Oxcarbazepine 150mg 4x day). Taken med since 2013; [facility] gave an emergency 3 day supply only. Pain right side of temple and radiates down to top of lip. Pain 10/10 - electrical shock feeling. Hurts to talk.

## 2023-08-08 ENCOUNTER — TELEPHONE (OUTPATIENT)
Age: 63
End: 2023-08-08

## 2023-08-08 NOTE — TELEPHONE ENCOUNTER
Liz Lim walked in wanting to know if she needs a refill on her medication, Plavix. She states that [facility] on [address] has been trying to get in touch with us to get this filled. I see that its been a while since she's had this medication filled.  Please verify with Dr. Nicolasa Gambino if he still wants her to take this, She can be reached at [phone]

## 2023-08-08 NOTE — TELEPHONE ENCOUNTER
Called and spoke with patient to make her aware that a message was sent out to Dr. Kim Marks. As soon as I hear back from the provider I will give her a call back.

## 2023-08-10 ENCOUNTER — TELEPHONE (OUTPATIENT)
Age: 63
End: 2023-08-10

## 2023-08-10 RX ORDER — CLOPIDOGREL BISULFATE 75 MG/1
75 TABLET ORAL DAILY
Qty: 30 TABLET | Refills: 5 | Status: SHIPPED | OUTPATIENT
Start: 2023-08-10

## 2023-08-10 NOTE — TELEPHONE ENCOUNTER
Called and spoke with patient to inform her that her medication was sent over to her pharmacy. Patient understood.

## 2023-08-23 RX ORDER — CLOPIDOGREL BISULFATE 75 MG/1
TABLET ORAL
Qty: 30 TABLET | Refills: 3 | OUTPATIENT
Start: 2023-08-23

## 2023-10-16 ENCOUNTER — OFFICE VISIT (OUTPATIENT)
Age: 63
End: 2023-10-16
Payer: MEDICAID

## 2023-10-16 VITALS
HEART RATE: 76 BPM | SYSTOLIC BLOOD PRESSURE: 116 MMHG | OXYGEN SATURATION: 97 % | HEIGHT: 64 IN | TEMPERATURE: 98.5 F | WEIGHT: 197 LBS | RESPIRATION RATE: 16 BRPM | BODY MASS INDEX: 33.63 KG/M2 | DIASTOLIC BLOOD PRESSURE: 74 MMHG

## 2023-10-16 DIAGNOSIS — I73.9 PERIPHERAL VASCULAR DISEASE (HCC): Primary | ICD-10-CM

## 2023-10-16 DIAGNOSIS — I87.303 CHRONIC VENOUS HYPERTENSION INVOLVING BOTH SIDES: ICD-10-CM

## 2023-10-16 PROCEDURE — 99213 OFFICE O/P EST LOW 20 MIN: CPT | Performed by: SURGERY

## 2024-01-31 ENCOUNTER — TELEPHONE (OUTPATIENT)
Age: 64
End: 2024-01-31

## 2024-01-31 NOTE — TELEPHONE ENCOUNTER
Patient called stating that needs refill for Plavix    [facility] in [place]  Pharmacy [phone]      Advised patient to allow up to 72 business hours for request to be processed, patient understood

## 2024-01-31 NOTE — TELEPHONE ENCOUNTER
Perfect Serve sent to provider. Per 's last office note in October he wanted her to follow up in 3 months. I called and rescheduled the patients follow up for this month instead of June 2024.

## 2024-02-01 RX ORDER — CLOPIDOGREL BISULFATE 75 MG/1
75 TABLET ORAL DAILY
Qty: 30 TABLET | Refills: 5 | Status: SHIPPED | OUTPATIENT
Start: 2024-02-01

## 2024-02-12 ENCOUNTER — OFFICE VISIT (OUTPATIENT)
Age: 64
End: 2024-02-12

## 2024-02-12 VITALS
DIASTOLIC BLOOD PRESSURE: 60 MMHG | SYSTOLIC BLOOD PRESSURE: 108 MMHG | WEIGHT: 201 LBS | HEIGHT: 64 IN | TEMPERATURE: 98.1 F | BODY MASS INDEX: 34.31 KG/M2 | HEART RATE: 73 BPM | OXYGEN SATURATION: 98 %

## 2024-02-12 DIAGNOSIS — I73.9 PAD (PERIPHERAL ARTERY DISEASE) (HCC): Primary | ICD-10-CM

## 2024-02-12 DIAGNOSIS — I87.303 CHRONIC VENOUS HYPERTENSION INVOLVING BOTH SIDES: ICD-10-CM

## 2024-02-12 RX ORDER — MAGNESIUM 200 MG
TABLET ORAL
COMMUNITY
Start: 2024-01-25

## 2024-02-12 RX ORDER — CHOLECALCIFEROL (VITAMIN D3) 1250 MCG
1 CAPSULE ORAL WEEKLY
COMMUNITY
Start: 2024-01-25

## 2024-02-12 RX ORDER — OXCARBAZEPINE 600 MG/1
TABLET ORAL
COMMUNITY
Start: 2024-02-07

## 2024-02-12 NOTE — PROGRESS NOTES
Identified pt with two pt identifiers(name and ). Reviewed record in preparation for visit and have obtained necessary documentation. All patient medications has been reviewed.  Chief Complaint   Patient presents with    Follow-up     3 month f/u PVD       Vitals:    24 1317   BP: 108/60   Pulse: 73   Temp: 98.1 °F (36.7 °C)   SpO2: 98%                   Coordination of Care Questionnaire:   1) Have you been to an emergency room, urgent care, or hospitalized since your last visit?   no    2. Have seen or consulted any other health care provider since your last visit? no

## 2024-02-23 NOTE — PROGRESS NOTES
Vascular History and Physical    Patient: Juliet Tatum  MRN: [id number]    YOB: 1960  Age: 63 y.o.  Sex: female     Chief Complaint:  Chief Complaint   Patient presents with    Follow-up     3 month f/u PVD       History of Present Illness: Juliet Tatum is a 63 y.o. very pleasant woman is here today for follow-up.  Patient denies any particular worsening right leg pain.  Patient also has a history of phlebitis and this was treated.  Patient also had a previous right leg angioplasty for PAD.  Patient also has chronic swelling both legs which has not gotten any worse.  I did recommend a compression stocking.  Patient had  successful right distal SFA 90% occlusion angioplasty early last year.    Patient currently denies any chest pain shortness of breath denies weight loss or abdominal pain or stroke symptoms  Social History:  Social Connections: Not on file       Past Medical History:  Past Medical History:   Diagnosis Date    Arthritis     Family history of ischemic heart disease     PVD (peripheral vascular disease) (HCC)     Smoker     Swelling     ankles, hands    Thromboembolus (HCC)     left leg    Trigeminal neuralgia        Surgical History:  Past Surgical History:   Procedure Laterality Date    GASTRIC BYPASS SURGERY  1991    HYSTERECTOMY (CERVIX STATUS UNKNOWN)  2001    OTHER SURGICAL HISTORY Left 01/2023    angiogram with intervention       Allergies:  Allergies   Allergen Reactions    Tramadol Hives and Itching       Current Meds:  Current Outpatient Medications   Medication Sig Dispense Refill    OXTELLAR  MG TB24 TAKE 3 TABLETS BY MOUTH EVERY DAY IN THE MORNING      Cyanocobalamin (VITAMIN B-12) 1000 MCG SUBL DISSOLVE 1 TABLET UNDER THE TONGUE EVERY DAY      Cholecalciferol (VITAMIN D3) 1.25 MG (49065 UT) CAPS Take 1 capsule by mouth Once a week at 5 PM      clopidogrel (PLAVIX) 75 MG tablet Take 1 tablet by mouth daily 30 tablet 5    diclofenac (VOLTAREN) 75 MG EC tablet Take

## 2024-05-23 ENCOUNTER — HOSPITAL ENCOUNTER (EMERGENCY)
Facility: HOSPITAL | Age: 64
Discharge: HOME | End: 2024-05-23
Attending: EMERGENCY MEDICINE
Payer: MEDICAID

## 2024-05-23 VITALS
OXYGEN SATURATION: 98 % | SYSTOLIC BLOOD PRESSURE: 118 MMHG | WEIGHT: 198 LBS | BODY MASS INDEX: 33.8 KG/M2 | DIASTOLIC BLOOD PRESSURE: 69 MMHG | HEIGHT: 64 IN | RESPIRATION RATE: 18 BRPM | HEART RATE: 69 BPM | TEMPERATURE: 97.9 F

## 2024-05-23 DIAGNOSIS — R35.0 URINARY FREQUENCY: Primary | ICD-10-CM

## 2024-05-23 LAB
APPEARANCE UR: CLEAR
BACTERIA URNS QL MICRO: NEGATIVE /HPF
BILIRUB UR QL: NEGATIVE
COLOR UR: YELLOW
EPITH CASTS URNS QL MICRO: ABNORMAL /LPF
GLUCOSE UR STRIP.AUTO-MCNC: NEGATIVE MG/DL
HGB UR QL STRIP: ABNORMAL
KETONES UR QL STRIP.AUTO: NEGATIVE MG/DL
LEUKOCYTE ESTERASE UR QL STRIP.AUTO: NEGATIVE
NITRITE UR QL STRIP.AUTO: NEGATIVE
PH UR STRIP: 7 (ref 5–8)
PROT UR STRIP-MCNC: ABNORMAL MG/DL
RBC #/AREA URNS HPF: ABNORMAL /HPF (ref 0–5)
SP GR UR REFRACTOMETRY: 1.01 (ref 1–1.03)
UROBILINOGEN UR QL STRIP.AUTO: 0.1 EU/DL (ref 0.2–1)
WBC URNS QL MICRO: ABNORMAL /HPF (ref 0–4)

## 2024-05-23 PROCEDURE — 81001 URINALYSIS AUTO W/SCOPE: CPT

## 2024-05-23 PROCEDURE — 99283 EMERGENCY DEPT VISIT LOW MDM: CPT

## 2024-05-23 ASSESSMENT — PAIN - FUNCTIONAL ASSESSMENT
PAIN_FUNCTIONAL_ASSESSMENT: 0-10
PAIN_FUNCTIONAL_ASSESSMENT: NONE - DENIES PAIN

## 2024-05-23 ASSESSMENT — PAIN SCALES - GENERAL: PAINLEVEL_OUTOF10: 6

## 2024-05-24 NOTE — ED PROVIDER NOTES
to follow-up with primary if the polyuria persists for GYN evaluation.  Return cautions given            Records Reviewed (source and summary of external notes): Prior medical records and Nursing notes.    Vitals:    Vitals:    05/23/24 2134 05/23/24 2204   BP: 115/63 118/69   Pulse: 66 69   Resp: 18 18   Temp: 97.9 °F (36.6 °C) 97.9 °F (36.6 °C)   TempSrc: Oral    SpO2: 100% 98%   Weight: 89.8 kg (198 lb)    Height: 1.626 m (5' 4\")         ED COURSE           Patient was given the following medications:  Medications - No data to display    CONSULTS: See ED Course/MDM for further details.            PROCEDURES   Unless otherwise noted above, none  Procedures      CRITICAL CARE TIME       ED IMPRESSION     1. Urinary frequency          DISPOSITION/PLAN   DISPOSITION Decision To Discharge 05/23/2024 10:02:00 PM    Discharge Note: The patient is stable for discharge home. The signs, symptoms, diagnosis, and discharge instructions have been discussed, understanding conveyed, and agreed upon. The patient is to follow up as recommended or return to ER should their symptoms worsen.      PATIENT REFERRED TO:  Your Primary Care doctor    Call in 1 day          DISCHARGE MEDICATIONS:     Medication List        ASK your doctor about these medications      cilostazol 50 MG tablet  Commonly known as: PLETAL     clopidogrel 75 MG tablet  Commonly known as: PLAVIX  Take 1 tablet by mouth daily     diclofenac 75 MG EC tablet  Commonly known as: VOLTAREN     hydroCHLOROthiazide 25 MG tablet  Commonly known as: HYDRODIURIL     Oxtellar  MG Tb24  Generic drug: OXcarbazepine ER     Vitamin B-12 1000 MCG Subl     Vitamin D3 1.25 MG (91235 UT) Caps                DISCONTINUED MEDICATIONS:  Discharge Medication List as of 5/23/2024 10:04 PM          I am the Primary Clinician of Record. Lissette khan MD (electronically signed)    (Please note that parts of this dictation were completed with voice recognition software. Quite often

## 2024-06-13 ENCOUNTER — TELEPHONE (OUTPATIENT)
Age: 64
End: 2024-06-13

## 2024-06-13 DIAGNOSIS — I73.9 PAD (PERIPHERAL ARTERY DISEASE) (HCC): Primary | ICD-10-CM

## 2024-06-13 NOTE — TELEPHONE ENCOUNTER
[facility] [phone] from central scheduling is calling to let us know that Order is incorrect and Order

## 2024-06-13 NOTE — TELEPHONE ENCOUNTER
I called and spoke with Issa Lopez scheduling, they state they couldn't pull a schedule up to schedule the testing since the order was about to . I messaged  to make sure he still wanted the test. He states that he does so same exact order was placed. Scheduling will call the patient to schedule.

## 2024-06-25 ENCOUNTER — HOSPITAL ENCOUNTER (OUTPATIENT)
Facility: HOSPITAL | Age: 64
Discharge: HOME | End: 2024-06-27
Attending: SURGERY
Payer: MEDICAID

## 2024-06-25 DIAGNOSIS — I73.9 PAD (PERIPHERAL ARTERY DISEASE) (HCC): ICD-10-CM

## 2024-06-25 PROCEDURE — 93923 UPR/LXTR ART STDY 3+ LVLS: CPT

## 2024-06-27 LAB
VAS LEFT ABI: 0.96
VAS LEFT ARM BP: 121 MMHG
VAS LEFT DORSALIS PEDIS BP: 81 MMHG
VAS LEFT PTA BP: 123 MMHG
VAS LEFT TBI: 0.71
VAS LEFT TOE PRESSURE: 91 MMHG
VAS RIGHT ABI: 0.29
VAS RIGHT ARM BP: 128 MMHG
VAS RIGHT DORSALIS PEDIS BP: 37 MMHG
VAS RIGHT PTA BP: 34 MMHG

## 2024-07-09 ENCOUNTER — APPOINTMENT (OUTPATIENT)
Facility: HOSPITAL | Age: 64
End: 2024-07-09
Payer: MEDICAID

## 2024-07-09 ENCOUNTER — HOSPITAL ENCOUNTER (OUTPATIENT)
Facility: HOSPITAL | Age: 64
Setting detail: OBSERVATION
Discharge: HOME | End: 2024-07-11
Attending: EMERGENCY MEDICINE | Admitting: INTERNAL MEDICINE
Payer: MEDICAID

## 2024-07-09 DIAGNOSIS — E87.1 HYPONATREMIA: ICD-10-CM

## 2024-07-09 DIAGNOSIS — R20.2 PARESTHESIA: Primary | ICD-10-CM

## 2024-07-09 LAB
ALBUMIN SERPL-MCNC: 4 G/DL (ref 3.5–5)
ALBUMIN/GLOB SERPL: 1.3 (ref 1.1–2.2)
ALP SERPL-CCNC: 90 U/L (ref 45–117)
ALT SERPL-CCNC: 19 U/L (ref 12–78)
ANION GAP SERPL CALC-SCNC: 11 MMOL/L (ref 5–15)
AST SERPL W P-5'-P-CCNC: 11 U/L (ref 15–37)
BASOPHILS # BLD: 0.1 K/UL (ref 0–0.1)
BASOPHILS NFR BLD: 1 % (ref 0–1)
BILIRUB SERPL-MCNC: 0.4 MG/DL (ref 0.2–1)
BUN SERPL-MCNC: 6 MG/DL (ref 6–20)
BUN/CREAT SERPL: 12 (ref 12–20)
CA-I BLD-MCNC: 9.4 MG/DL (ref 8.5–10.1)
CHLORIDE SERPL-SCNC: 75 MMOL/L (ref 97–108)
CO2 SERPL-SCNC: 25 MMOL/L (ref 21–32)
CREAT SERPL-MCNC: 0.5 MG/DL (ref 0.55–1.02)
DIFFERENTIAL METHOD BLD: ABNORMAL
EKG ATRIAL RATE: 63 BPM
EKG DIAGNOSIS: NORMAL
EKG P AXIS: 47 DEGREES
EKG P-R INTERVAL: 162 MS
EKG Q-T INTERVAL: 452 MS
EKG QRS DURATION: 104 MS
EKG QTC CALCULATION (BAZETT): 462 MS
EKG R AXIS: 14 DEGREES
EKG T AXIS: 37 DEGREES
EKG VENTRICULAR RATE: 63 BPM
EOSINOPHIL # BLD: 0.1 K/UL (ref 0–0.4)
EOSINOPHIL NFR BLD: 2 % (ref 0–7)
ERYTHROCYTE [DISTWIDTH] IN BLOOD BY AUTOMATED COUNT: 12.9 % (ref 11.5–14.5)
GLOBULIN SER CALC-MCNC: 3 G/DL (ref 2–4)
GLUCOSE SERPL-MCNC: 86 MG/DL (ref 65–100)
HCT VFR BLD AUTO: 33.6 % (ref 35–47)
HGB BLD-MCNC: 12.3 G/DL (ref 11.5–16)
IMM GRANULOCYTES # BLD AUTO: 0 K/UL (ref 0–0.04)
IMM GRANULOCYTES NFR BLD AUTO: 1 % (ref 0–0.5)
LYMPHOCYTES # BLD: 1.8 K/UL (ref 0.8–3.5)
LYMPHOCYTES NFR BLD: 29 % (ref 12–49)
MCH RBC QN AUTO: 28.2 PG (ref 26–34)
MCHC RBC AUTO-ENTMCNC: 36.6 G/DL (ref 30–36.5)
MCV RBC AUTO: 77.1 FL (ref 80–99)
MONOCYTES # BLD: 0.3 K/UL (ref 0–1)
MONOCYTES NFR BLD: 5 % (ref 5–13)
NEUTS SEG # BLD: 3.9 K/UL (ref 1.8–8)
NEUTS SEG NFR BLD: 62 % (ref 32–75)
NRBC # BLD: 0 K/UL (ref 0–0.01)
NRBC BLD-RTO: 0 PER 100 WBC
PLATELET # BLD AUTO: 293 K/UL (ref 150–400)
PMV BLD AUTO: 8.3 FL (ref 8.9–12.9)
POTASSIUM SERPL-SCNC: 3.6 MMOL/L (ref 3.5–5.1)
PROT SERPL-MCNC: 7 G/DL (ref 6.4–8.2)
RBC # BLD AUTO: 4.36 M/UL (ref 3.8–5.2)
SODIUM SERPL-SCNC: 111 MMOL/L (ref 136–145)
TROPONIN I SERPL HS-MCNC: 8 NG/L (ref 0–51)
WBC # BLD AUTO: 6.3 K/UL (ref 3.6–11)

## 2024-07-09 PROCEDURE — 84484 ASSAY OF TROPONIN QUANT: CPT

## 2024-07-09 PROCEDURE — 96361 HYDRATE IV INFUSION ADD-ON: CPT

## 2024-07-09 PROCEDURE — G0378 HOSPITAL OBSERVATION PER HR: HCPCS

## 2024-07-09 PROCEDURE — 81001 URINALYSIS AUTO W/SCOPE: CPT

## 2024-07-09 PROCEDURE — 85025 COMPLETE CBC W/AUTO DIFF WBC: CPT

## 2024-07-09 PROCEDURE — 2580000003 HC RX 258: Performed by: PHYSICIAN ASSISTANT

## 2024-07-09 PROCEDURE — 6360000002 HC RX W HCPCS: Performed by: PHYSICIAN ASSISTANT

## 2024-07-09 PROCEDURE — 99285 EMERGENCY DEPT VISIT HI MDM: CPT

## 2024-07-09 PROCEDURE — 96374 THER/PROPH/DIAG INJ IV PUSH: CPT

## 2024-07-09 PROCEDURE — 71045 X-RAY EXAM CHEST 1 VIEW: CPT

## 2024-07-09 PROCEDURE — 80053 COMPREHEN METABOLIC PANEL: CPT

## 2024-07-09 PROCEDURE — 6370000000 HC RX 637 (ALT 250 FOR IP): Performed by: PHYSICIAN ASSISTANT

## 2024-07-09 PROCEDURE — 93005 ELECTROCARDIOGRAM TRACING: CPT | Performed by: PHYSICIAN ASSISTANT

## 2024-07-09 PROCEDURE — 36415 COLL VENOUS BLD VENIPUNCTURE: CPT

## 2024-07-09 RX ORDER — FUROSEMIDE 10 MG/ML
40 INJECTION INTRAMUSCULAR; INTRAVENOUS
Status: COMPLETED | OUTPATIENT
Start: 2024-07-09 | End: 2024-07-09

## 2024-07-09 RX ORDER — 0.9 % SODIUM CHLORIDE 0.9 %
1000 INTRAVENOUS SOLUTION INTRAVENOUS ONCE
Status: DISCONTINUED | OUTPATIENT
Start: 2024-07-09 | End: 2024-07-09

## 2024-07-09 RX ORDER — 0.9 % SODIUM CHLORIDE 0.9 %
1000 INTRAVENOUS SOLUTION INTRAVENOUS ONCE
Status: COMPLETED | OUTPATIENT
Start: 2024-07-09 | End: 2024-07-10

## 2024-07-09 RX ORDER — BUTALBITAL, ACETAMINOPHEN AND CAFFEINE 50; 325; 40 MG/1; MG/1; MG/1
1 TABLET ORAL
Status: COMPLETED | OUTPATIENT
Start: 2024-07-09 | End: 2024-07-09

## 2024-07-09 RX ORDER — NAPROXEN 250 MG/1
250 TABLET ORAL 2 TIMES DAILY WITH MEALS
COMMUNITY

## 2024-07-09 RX ADMIN — SODIUM CHLORIDE 1000 ML: 9 INJECTION, SOLUTION INTRAVENOUS at 19:40

## 2024-07-09 RX ADMIN — BUTALBITAL, ACETAMINOPHEN, AND CAFFEINE 1 TABLET: 50; 325; 40 TABLET ORAL at 19:32

## 2024-07-09 RX ADMIN — FUROSEMIDE 40 MG: 10 INJECTION, SOLUTION INTRAMUSCULAR; INTRAVENOUS at 22:29

## 2024-07-09 RX ADMIN — SODIUM CHLORIDE 1000 ML: 9 INJECTION, SOLUTION INTRAVENOUS at 22:28

## 2024-07-09 ASSESSMENT — PAIN DESCRIPTION - LOCATION: LOCATION: HEAD

## 2024-07-09 ASSESSMENT — LIFESTYLE VARIABLES
HOW OFTEN DO YOU HAVE A DRINK CONTAINING ALCOHOL: NEVER
HOW MANY STANDARD DRINKS CONTAINING ALCOHOL DO YOU HAVE ON A TYPICAL DAY: PATIENT DOES NOT DRINK

## 2024-07-09 ASSESSMENT — PAIN SCALES - GENERAL
PAINLEVEL_OUTOF10: 0
PAINLEVEL_OUTOF10: 6

## 2024-07-09 ASSESSMENT — PAIN - FUNCTIONAL ASSESSMENT: PAIN_FUNCTIONAL_ASSESSMENT: 0-10

## 2024-07-09 NOTE — ED NOTES
Pt states that she needs a stent placed in her right leg due to poor perfusion. States that numbness feeling is normal in right foot. States that her weakness is in her legs. She states that her legs feel like they're going to give out on her. Endorses multiple falls recently.     Tingling and numbness feeling in hands and left foot is new.

## 2024-07-09 NOTE — ED PROVIDER NOTES
findings: Xray Interpreted by me.  Shows no acute pathology    Interpretation per the Radiologist below, if available at the time of this note:  XR CHEST PORTABLE    (Results Pending)        ED COURSE and DIFFERENTIAL DIAGNOSIS/MDM   1:53 AM Differential and Considerations:   This 65 y/o female patient presents with paresthesias, most likely due to electrolyte abnormality versus dehydration versus anxiety. Differential diagnoses includes ACS, electrolyte derangement, dehydration, anxiety, CTS, neuropathy, tension headache, viral syndrome. Presentation not consistent with emergent neurologic etiologies to include brain / spinal cord nerve root or nerve problem given history & physical. Presentation not consistent with immune phenomenon to include GBS or vasculitis. Presentation not consistent with toxins to include botulism, diptheria, tick-borne illnesses, heavy metal poisoning. Presentation not consistent with acute drug toxicity or metabolic issues.    Plan: basic and cardiac labs, EKG, CXR, supportive care, reassessment      Records Reviewed (source and summary of external notes): Prior medical records and Nursing notes    Vitals:    Vitals:    07/09/24 2229 07/09/24 2230 07/09/24 2245 07/09/24 2330   BP: (!) 144/61 (!) 144/61 134/68 116/69   Pulse:  65 65 65   Resp:  13 18 18   Temp:       TempSrc:       SpO2:  100% 100% 98%   Weight:       Height:            ED COURSE  9:02 PM  Patient reassessed and updated on available results.  Patient chronically hyponatremic but more severe today.  Discussed case with ED attending who is in agreement with care plan as outlined.  Will consult hospitalist for admission.  Patient updated on plan and has no new complaints or concerns.  Will continue to closely monitor in ED.    11:40 PM  Imaging interpreted by me and is negative for acute pathology.  Reviewed by radiologist who is in agreement.  Patient updated on plan and conveys good understanding and agreement.  No new

## 2024-07-09 NOTE — ED TRIAGE NOTES
Pt arrives via ems for complaints of weakness x 1 week. Complaining of tingling in her hands and feet also. Only new symptom today is a headache.     .     Hx of hypertension and high cholesterol.

## 2024-07-10 LAB
ALBUMIN SERPL-MCNC: 3.6 G/DL (ref 3.5–5)
ALBUMIN/GLOB SERPL: 1.2 (ref 1.1–2.2)
ALP SERPL-CCNC: 90 U/L (ref 45–117)
ALT SERPL-CCNC: 19 U/L (ref 12–78)
ANION GAP SERPL CALC-SCNC: 7 MMOL/L (ref 5–15)
APPEARANCE UR: CLEAR
AST SERPL W P-5'-P-CCNC: 8 U/L (ref 15–37)
BACTERIA URNS QL MICRO: NEGATIVE /HPF
BILIRUB SERPL-MCNC: 0.3 MG/DL (ref 0.2–1)
BILIRUB UR QL: NEGATIVE
BUN SERPL-MCNC: 14 MG/DL (ref 6–20)
BUN/CREAT SERPL: 16 (ref 12–20)
CA-I BLD-MCNC: 9 MG/DL (ref 8.5–10.1)
CHLORIDE SERPL-SCNC: 87 MMOL/L (ref 97–108)
CO2 SERPL-SCNC: 29 MMOL/L (ref 21–32)
COLOR UR: NORMAL
CREAT SERPL-MCNC: 0.85 MG/DL (ref 0.55–1.02)
EPITH CASTS URNS QL MICRO: NORMAL /LPF
GLOBULIN SER CALC-MCNC: 2.9 G/DL (ref 2–4)
GLUCOSE SERPL-MCNC: 143 MG/DL (ref 65–100)
GLUCOSE UR STRIP.AUTO-MCNC: NEGATIVE MG/DL
HGB UR QL STRIP: NEGATIVE
KETONES UR QL STRIP.AUTO: NEGATIVE MG/DL
LEUKOCYTE ESTERASE UR QL STRIP.AUTO: NEGATIVE
NITRITE UR QL STRIP.AUTO: NEGATIVE
OSMOLALITY SERPL: 247 MOSM/KG H2O
PH UR STRIP: 7 (ref 5–8)
POTASSIUM SERPL-SCNC: 3.3 MMOL/L (ref 3.5–5.1)
PROT SERPL-MCNC: 6.5 G/DL (ref 6.4–8.2)
PROT UR STRIP-MCNC: NEGATIVE MG/DL
RBC #/AREA URNS HPF: NORMAL /HPF (ref 0–5)
SODIUM SERPL-SCNC: 123 MMOL/L (ref 136–145)
SP GR UR REFRACTOMETRY: <1.005 (ref 1–1.03)
URINE CULTURE IF INDICATED: NORMAL
UROBILINOGEN UR QL STRIP.AUTO: 0.1 EU/DL (ref 0.1–1)
WBC URNS QL MICRO: NORMAL /HPF (ref 0–4)

## 2024-07-10 PROCEDURE — 36415 COLL VENOUS BLD VENIPUNCTURE: CPT

## 2024-07-10 PROCEDURE — 80053 COMPREHEN METABOLIC PANEL: CPT

## 2024-07-10 PROCEDURE — 6370000000 HC RX 637 (ALT 250 FOR IP): Performed by: INTERNAL MEDICINE

## 2024-07-10 PROCEDURE — 83930 ASSAY OF BLOOD OSMOLALITY: CPT

## 2024-07-10 PROCEDURE — G0378 HOSPITAL OBSERVATION PER HR: HCPCS

## 2024-07-10 PROCEDURE — 96361 HYDRATE IV INFUSION ADD-ON: CPT

## 2024-07-10 RX ORDER — CLOPIDOGREL BISULFATE 75 MG/1
75 TABLET ORAL DAILY
Status: DISCONTINUED | OUTPATIENT
Start: 2024-07-10 | End: 2024-07-11 | Stop reason: HOSPADM

## 2024-07-10 RX ORDER — HYDROCHLOROTHIAZIDE 25 MG/1
25 TABLET ORAL DAILY
Status: DISCONTINUED | OUTPATIENT
Start: 2024-07-10 | End: 2024-07-10

## 2024-07-10 RX ORDER — ENOXAPARIN SODIUM 100 MG/ML
40 INJECTION SUBCUTANEOUS DAILY
Status: DISCONTINUED | OUTPATIENT
Start: 2024-07-10 | End: 2024-07-11 | Stop reason: HOSPADM

## 2024-07-10 RX ORDER — OXCARBAZEPINE 300 MG/1
600 TABLET, FILM COATED ORAL 2 TIMES DAILY
Status: DISCONTINUED | OUTPATIENT
Start: 2024-07-10 | End: 2024-07-11 | Stop reason: HOSPADM

## 2024-07-10 RX ORDER — NAPROXEN 500 MG/1
250 TABLET ORAL 2 TIMES DAILY WITH MEALS
Status: DISCONTINUED | OUTPATIENT
Start: 2024-07-10 | End: 2024-07-10

## 2024-07-10 RX ADMIN — HYDROCHLOROTHIAZIDE 25 MG: 25 TABLET ORAL at 08:17

## 2024-07-10 RX ADMIN — CLOPIDOGREL BISULFATE 75 MG: 75 TABLET ORAL at 08:15

## 2024-07-10 RX ADMIN — OXCARBAZEPINE 600 MG: 300 TABLET, FILM COATED ORAL at 20:27

## 2024-07-10 RX ADMIN — NAPROXEN 250 MG: 500 TABLET ORAL at 08:15

## 2024-07-10 RX ADMIN — OXCARBAZEPINE 600 MG: 300 TABLET, FILM COATED ORAL at 08:15

## 2024-07-10 ASSESSMENT — PAIN SCALES - GENERAL
PAINLEVEL_OUTOF10: 0
PAINLEVEL_OUTOF10: 0

## 2024-07-10 NOTE — ED NOTES
ED TO INPATIENT SBAR HANDOFF    Patient Name: Juliet Tatum   Preferred Name: Juliet  : 1960  64 y.o.   Family/Caregiver Present: no   Code Status Order: No Order  PO Status: NPO:No  Telemetry Order: Yes  C-SSRS: Risk of Suicide: No Risk  Sitter no     Restraints:     Sepsis Risk Score      Situation  Chief Complaint   Patient presents with    Extremity Weakness     Brief Description of Patient's Condition: Pt arrived via ems from home for complaints of weakness in her legs x1 week. Pt was also complaining of numbness and tingling in her hands that had also been going on for a week. Pt being admitted under Dr. Gustafson for paresthesia and hyponatremia.   Mental Status: oriented, alert, coherent, logical, thought processes intact, and able to concentrate and follow conversation  Arrived from:Home  Imaging:   XR CHEST PORTABLE    (Results Pending)     Abnormal labs:   Abnormal Labs Reviewed   CBC WITH AUTO DIFFERENTIAL - Abnormal; Notable for the following components:       Result Value    Hematocrit 33.6 (*)     MCV 77.1 (*)     MCHC 36.6 (*)     MPV 8.3 (*)     Immature Granulocytes % 1 (*)     All other components within normal limits   COMPREHENSIVE METABOLIC PANEL W/ REFLEX TO MG FOR LOW K - Abnormal; Notable for the following components:    Sodium 111 (*)     Chloride 75 (*)     Creatinine 0.50 (*)     AST 11 (*)     All other components within normal limits       Background  Allergies:   Allergies   Allergen Reactions    Tramadol Hives and Itching     History:   Past Medical History:   Diagnosis Date    Arthritis     Family history of ischemic heart disease     PVD (peripheral vascular disease) ([facility])     Smoker     Swelling     ankles, hands    Thromboembolus ([facility])     left leg    Trigeminal neuralgia        Assessment  Vitals: MEWS Score: 0  Level of Consciousness: Alert (0)   Vitals:    24 2200 24 2202 24 2229 24 2230   BP: (!) 137/59  (!) 144/61 (!) 144/61   Pulse: 66 68

## 2024-07-10 NOTE — PLAN OF CARE
Problem: Discharge Planning  Goal: Discharge to home or other facility with appropriate resources  Outcome: Progressing     Problem: Safety - Adult  Goal: Free from fall injury  7/10/2024 0824 by Eve Mcmillan, RN  Outcome: Progressing  7/10/2024 0038 by Mora Eldridge, RN  Outcome: Progressing

## 2024-07-10 NOTE — CARE COORDINATION
07/10/24 1119   Service Assessment   Patient Orientation Alert and Oriented   Cognition Alert   History Provided By Patient   Primary Caregiver Self   Support Systems Family Members   Patient's Healthcare Decision Maker is: Legal Next of Kin   PCP Verified by CM Yes   Last Visit to PCP Within last 3 months   Prior Functional Level Independent in ADLs/IADLs   Current Functional Level Independent in ADLs/IADLs   Can patient return to prior living arrangement Yes   Ability to make needs known: Good   Family able to assist with home care needs: Yes   Would you like for me to discuss the discharge plan with any other family members/significant others, and if so, who? 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    Patient independent in her ADL's and IADL's, requires no DME, is an active , current with PCP.    Patient uses [facility] [address], declined Meds to beds.    No CM needs at this time, plan to return home self care once medically stalbe, CM will continue to follow and monitor for needs.      Advance Care Planning     General Advance Care Planning (ACP) Conversation    Date of Conversation: 7/10/2024  Conducted with: Patient with Decision Making Capacity  Other persons present: None    Healthcare Decision Maker: No healthcare decision makers have been documented.  Click here to complete HealthCare Decision Makers including selection of the Healthcare Decision Maker Relationship (ie \"Primary\")       Content/Action Overview:  Has NO ACP documents-Information provided  Reviewed DNR/DNI and patient elects Full Code (Attempt Resuscitation)        Length of Voluntary ACP Conversation in minutes:  <16 minutes (Non-Billable)    PRIYA Reid

## 2024-07-10 NOTE — PROGRESS NOTES
4 Eyes Skin Assessment     NAME:  Juliet Tatum  YOB: 1960  MEDICAL RECORD NUMBER:  [id number]    The patient is being assessed for  Other weekly assessment    I agree that at least one RN has performed a thorough Head to Toe Skin Assessment on the patient. ALL assessment sites listed below have been assessed.      Areas assessed by both nurses:    Head, Face, Ears, Shoulders, Back, Chest, Arms, Elbows, Hands, Sacrum. Buttock, Coccyx, Ischium, and Legs. Feet and Heels        Does the Patient have a Wound? No noted wound(s)       Kishan Prevention initiated by RN: Yes  Wound Care Orders initiated by RN: No    Pressure Injury (Stage 3,4, Unstageable, DTI, NWPT, and Complex wounds) if present, place Wound referral order by RN under : No    New Ostomies, if present place, Ostomy referral order under : No     Nurse 1 eSignature: Electronically signed by Eve Mcmillan RN on 7/10/24 at 5:58 PM EDT    **SHARE this note so that the co-signing nurse can place an eSignature**    Nurse 2 eSignature: Electronically signed by Pam Amezcua RN on 7/10/24 at 5:59 PM EDT

## 2024-07-10 NOTE — PROGRESS NOTES
4 Eyes Skin Assessment     NAME:  Juliet Tatum  YOB: 1960  MEDICAL RECORD NUMBER:  [id number]    The patient is being assessed for  Admission    I agree that at least one RN has performed a thorough Head to Toe Skin Assessment on the patient. ALL assessment sites listed below have been assessed.      Areas assessed by both nurses:    Head, Face, Ears, Shoulders, Back, Chest, Arms, Elbows, Hands, Sacrum. Buttock, Coccyx, Ischium, Legs. Feet and Heels, and Under Medical Devices         Does the Patient have a Wound? No noted wound(s)       Kishan Prevention initiated by RN: No  Wound Care Orders initiated by RN: No    Pressure Injury (Stage 3,4, Unstageable, DTI, NWPT, and Complex wounds) if present, place Wound referral order by RN under : No    New Ostomies, if present place, Ostomy referral order under : No     Nurse 1 eSignature: Electronically signed by Mora Eldridge RN on 7/10/24 at 6:42 AM EDT    **SHARE this note so that the co-signing nurse can place an eSignature**    Nurse 2 eSignature: Electronically signed by Alyce Kat RN on 7/10/24 at 6:44 AM EDT

## 2024-07-10 NOTE — H&P
HISTORY AND PHYSICAL             Date: 7/10/2024        Patient Name: Juliet Tatum     YOB: 1960      Age:  64 y.o.    Chief Complaint     Chief Complaint   Patient presents with    Extremity Weakness          History Obtained From   patient    History of Present Illness   This is a 64-year-old obese black female with history of trigeminal neuralgia, arthritis who presented with extreme weakness.  She states that she was in her usual state of health until few days ago when she noticed she was just a little fatigue so she thought she was dehydrated so she drank almost 8 bottles of 8 ounce Yoel water.  She felt worse with leg feeling very weak Jell-O like so she came to the hospital.  In the emergency room she was found to have hyponatremia and recommended for admission.  She denies any nausea vomiting headache diarrhea.  She denies confusion.    Past Medical History     Past Medical History:   Diagnosis Date    Arthritis     Family history of ischemic heart disease     PVD (peripheral vascular disease) (HCC)     Smoker     Swelling     ankles, hands    Thromboembolus (HCC)     left leg    Trigeminal neuralgia    Obesity  Arthritis    Past Surgical History     Past Surgical History:   Procedure Laterality Date    GASTRIC BYPASS SURGERY  1991    HYSTERECTOMY (CERVIX STATUS UNKNOWN)  2001    OTHER SURGICAL HISTORY Left 01/2023    angiogram with intervention        Medications Prior to Admission     Prior to Admission medications    Medication Sig Start Date End Date Taking? Authorizing Provider   naproxen (NAPROSYN) 250 MG tablet Take 1 tablet by mouth 2 times daily (with meals)   Yes Vincent Rios MD   OXTELLAR  MG TB24 Takes 2 tablets 2/7/24   Vincent Rios MD   Cyanocobalamin (VITAMIN B-12) 1000 MCG SUBL DISSOLVE 1 TABLET UNDER THE TONGUE EVERY DAY 1/25/24   Vincent Rios MD   Cholecalciferol (VITAMIN D3) 1.25 MG (28015 UT) CAPS Take 1 capsule by mouth Once a week at

## 2024-07-10 NOTE — PROGRESS NOTES
4 Eyes Skin Assessment     NAME:  Juliet Tatum  YOB: 1960  MEDICAL RECORD NUMBER:  [id number]    The patient is being assessed for  Other *** weekly assessment    I agree that at least one RN has performed a thorough Head to Toe Skin Assessment on the patient. ALL assessment sites listed below have been assessed.      Areas assessed by both nurses:    Head, Face, Ears, Shoulders, Back, Chest, Arms, Elbows, Hands, Sacrum. Buttock, Coccyx, Ischium, and Legs. Feet and Heels        Does the Patient have a Wound? No noted wound(s)       Kishan Prevention initiated by RN: Yes  Wound Care Orders initiated by RN: No    Pressure Injury (Stage 3,4, Unstageable, DTI, NWPT, and Complex wounds) if present, place Wound referral order by RN under : No    New Ostomies, if present place, Ostomy referral order under : No     Nurse 1 eSignature: Electronically signed by Eve Mcmillan RN on 7/10/24 at 5:52 PM EDT    **SHARE this note so that the co-signing nurse can place an eSignature**    Nurse 2 eSignature: {Esignature:[id number]}

## 2024-07-11 VITALS
RESPIRATION RATE: 18 BRPM | HEIGHT: 64 IN | WEIGHT: 200 LBS | BODY MASS INDEX: 34.15 KG/M2 | SYSTOLIC BLOOD PRESSURE: 132 MMHG | TEMPERATURE: 98 F | HEART RATE: 69 BPM | DIASTOLIC BLOOD PRESSURE: 48 MMHG | OXYGEN SATURATION: 100 %

## 2024-07-11 LAB
ALBUMIN SERPL-MCNC: 3.3 G/DL (ref 3.5–5)
ALBUMIN/GLOB SERPL: 1.1 (ref 1.1–2.2)
ALP SERPL-CCNC: 80 U/L (ref 45–117)
ALT SERPL-CCNC: 16 U/L (ref 12–78)
ANION GAP SERPL CALC-SCNC: 6 MMOL/L (ref 5–15)
AST SERPL W P-5'-P-CCNC: 9 U/L (ref 15–37)
BILIRUB SERPL-MCNC: 0.3 MG/DL (ref 0.2–1)
BUN SERPL-MCNC: 12 MG/DL (ref 6–20)
BUN/CREAT SERPL: 18 (ref 12–20)
CA-I BLD-MCNC: 8.8 MG/DL (ref 8.5–10.1)
CHLORIDE SERPL-SCNC: 88 MMOL/L (ref 97–108)
CO2 SERPL-SCNC: 30 MMOL/L (ref 21–32)
CREAT SERPL-MCNC: 0.67 MG/DL (ref 0.55–1.02)
GLOBULIN SER CALC-MCNC: 3.1 G/DL (ref 2–4)
GLUCOSE SERPL-MCNC: 103 MG/DL (ref 65–100)
POTASSIUM SERPL-SCNC: 3.5 MMOL/L (ref 3.5–5.1)
PROT SERPL-MCNC: 6.4 G/DL (ref 6.4–8.2)
SODIUM SERPL-SCNC: 124 MMOL/L (ref 136–145)

## 2024-07-11 PROCEDURE — 96361 HYDRATE IV INFUSION ADD-ON: CPT

## 2024-07-11 PROCEDURE — 80053 COMPREHEN METABOLIC PANEL: CPT

## 2024-07-11 PROCEDURE — 2580000003 HC RX 258: Performed by: INTERNAL MEDICINE

## 2024-07-11 PROCEDURE — 6370000000 HC RX 637 (ALT 250 FOR IP): Performed by: INTERNAL MEDICINE

## 2024-07-11 PROCEDURE — 36415 COLL VENOUS BLD VENIPUNCTURE: CPT

## 2024-07-11 PROCEDURE — G0378 HOSPITAL OBSERVATION PER HR: HCPCS

## 2024-07-11 RX ORDER — SODIUM CHLORIDE 9 MG/ML
INJECTION, SOLUTION INTRAVENOUS CONTINUOUS
Status: DISPENSED | OUTPATIENT
Start: 2024-07-11 | End: 2024-07-11

## 2024-07-11 RX ADMIN — CLOPIDOGREL BISULFATE 75 MG: 75 TABLET ORAL at 08:58

## 2024-07-11 RX ADMIN — SODIUM CHLORIDE: 9 INJECTION, SOLUTION INTRAVENOUS at 12:32

## 2024-07-11 ASSESSMENT — PAIN SCALES - GENERAL: PAINLEVEL_OUTOF10: 3

## 2024-07-11 NOTE — PLAN OF CARE
Problem: Discharge Planning  Goal: Discharge to home or other facility with appropriate resources  7/11/2024 0904 by Eve Mcmillan, RN  Outcome: Progressing  7/10/2024 2243 by Sole Steel, RN  Outcome: Progressing  Flowsheets (Taken 7/10/2024 2015)  Discharge to home or other facility with appropriate resources:   Identify barriers to discharge with patient and caregiver   Arrange for needed discharge resources and transportation as appropriate   Arrange for interpreters to assist at discharge as needed   Identify discharge learning needs (meds, wound care, etc)   Refer to discharge planning if patient needs post-hospital services based on physician order or complex needs related to functional status, cognitive ability or social support system     Problem: Safety - Adult  Goal: Free from fall injury  7/11/2024 0904 by Eve Mcmillan, RN  Outcome: Progressing  7/10/2024 2243 by Sole Steel, RN  Outcome: Progressing

## 2024-07-11 NOTE — PROGRESS NOTES
Patient received discharge packet and verbalized understanding of follow up appointments and medication instructions. IV and tele removed. Patient discharged home/self care; daughter provided transportation.

## 2024-07-11 NOTE — DISCHARGE SUMMARY
Discharge Summary    Date: 7/11/2024  Patient Name: Juliet Tatum    YOB: 1960     Age: 64 y.o.    Admit Date: 7/9/2024  Discharge Date: 7/11/2024  Discharge Condition: Stable    Admission Diagnosis  Paresthesia [R20.2];Hyponatremia [E87.1]      Discharge Diagnosis  Principal Problem:    Hyponatremia  Resolved Problems:    * No resolved hospital problems. *      Hospital Stay  Narrative of Hospital Course:  See H&P for full details.    Briefly This is a 64-year-old black female with history of arthritis who presents with weakness found to have severe hyponatremia admitted for further care.    Hospital course.  Patient was admitted to the hospital.  She was started on IV fluid and HCTZ were discontinued.  She continues to improve.  Her sodium improved to 124,  she feels much better but upset that she is not getting her trigeminal neuralgia medication which we do not carry the exact form that she has.  She would rather take her own.  She clearly has dehydration as evidenced by hyponatremia and hypochloremia with hypotension.  Will give her another normal saline bolus and discharge later on today.  She wants to go home and take her own medications.  She has been informed not to take the HCTZ again and follow-up with her PCP to get a repeat BMP.  I will also inform her PCP Dr. Ddod regarding this.    Discharge diagnosis  #1.  Severe hyponatremia  #2.  Severe hypochloremia  #3.  Intravascular volume depletion  #4.  Arthritis      Consultants:  IP CONSULT TO HOSPITALIST    Surgeries/procedures Performed:  IV fluids     Treatments:    IV Hydration        Discharge Plan/Disposition:  Home    Hospital/Incidental Findings Requiring Follow Up:    Patient Instructions:    Diet:    Activity:Activity as Tolerated  For number of days (if applicable):      Other Instructions: Discontinue HCTZ.    Provider Follow-Up:   No follow-ups on file.     Significant Diagnostic Studies:    Recent Labs:  Admission on

## 2024-07-11 NOTE — PLAN OF CARE
Problem: Discharge Planning  Goal: Discharge to home or other facility with appropriate resources  7/11/2024 1520 by Eve Mcmillan RN  Outcome: Adequate for Discharge  7/11/2024 0904 by Eve Mcmillan RN  Outcome: Progressing     Problem: Safety - Adult  Goal: Free from fall injury  7/11/2024 1520 by Eve Mcmillan, RN  Outcome: Adequate for Discharge  7/11/2024 0904 by Eve Mcmillan RN  Outcome: Progressing     Problem: Pain  Goal: Verbalizes/displays adequate comfort level or baseline comfort level  Outcome: Adequate for Discharge

## 2024-07-11 NOTE — PROGRESS NOTES
PT eval order received and acknowledged. Pt screened and is currently presenting with indep functional mobility/transfers. PT evaluation order will be discontinued at this time as pt has no acute PT needs. Please reorder PT if pt functional status changes. Thank you.    [facility] AM-PAC™ “6 Clicks”         Basic Mobility Inpatient Short Form  How much difficulty does the patient currently have... Unable A Lot A Little None   1.  Turning over in bed (including adjusting bedclothes, sheets and blankets)?   [] 1   [] 2   [] 3   [x] 4   2.  Sitting down on and standing up from a chair with arms ( e.g., wheelchair, bedside commode, etc.)   [] 1   [] 2   [] 3   [x] 4   3.  Moving from lying on back to sitting on the side of the bed?   [] 1   [] 2   [] 3   [x] 4          How much help from another person does the patient currently need... Total A Lot A Little None   4.  Moving to and from a bed to a chair (including a wheelchair)?   [] 1   [] 2   [] 3   [x] 4   5.  Need to walk in hospital room?   [] 1   [] 2   [] 3   [x] 4   6.  Climbing 3-5 steps with a railing?   [] 1   [] 2   [] 3   [x] 4   © 2007, Trustees of [facility], under license to [employer]. All rights reserved     Score:  Initial: 24/24 Most Recent: X (Date: 7/11/2024 )   Interpretation of Tool:  Represents activities that are increasingly more difficult (i.e. Bed mobility, Transfers, Gait).  Score 24 23 22-20 19-15 14-10 9-7 6   Modifier CH CI CJ CK CL CM CN

## 2024-07-11 NOTE — CARE COORDINATION
Transition of Care Plan:    RUR: n/a due to OBS  Prior Level of Functioning: independent  Disposition: home self  If SNF or IPR: Date FOC offered:   Date FOC received:   Accepting facility:   Date authorization started with reference number:   Date authorization received and expires:   Follow up appointments: yes  DME needed: n/a  Transportation at discharge: patient arranged  IM/IMM Medicare/ letter given: n/a  Is patient a  and connected with [facility]?    If yes, was [place] transfer form completed and [facility] notified?   Caregiver Contact:   Discharge Caregiver contacted prior to discharge? Patient aware  Care Conference needed?   Barriers to discharge: n/a

## 2024-07-11 NOTE — PLAN OF CARE
Problem: Discharge Planning  Goal: Discharge to home or other facility with appropriate resources  Outcome: Progressing  Flowsheets (Taken 7/10/2024 2015)  Discharge to home or other facility with appropriate resources:   Identify barriers to discharge with patient and caregiver   Arrange for needed discharge resources and transportation as appropriate   Arrange for interpreters to assist at discharge as needed   Identify discharge learning needs (meds, wound care, etc)   Refer to discharge planning if patient needs post-hospital services based on physician order or complex needs related to functional status, cognitive ability or social support system     Problem: Safety - Adult  Goal: Free from fall injury  Outcome: Progressing

## 2024-07-15 ENCOUNTER — OFFICE VISIT (OUTPATIENT)
Age: 64
End: 2024-07-15
Payer: MEDICAID

## 2024-07-15 VITALS
BODY MASS INDEX: 34.49 KG/M2 | DIASTOLIC BLOOD PRESSURE: 73 MMHG | RESPIRATION RATE: 17 BRPM | HEART RATE: 75 BPM | TEMPERATURE: 98.2 F | HEIGHT: 64 IN | OXYGEN SATURATION: 98 % | SYSTOLIC BLOOD PRESSURE: 131 MMHG | WEIGHT: 202 LBS

## 2024-07-15 DIAGNOSIS — I73.9 PERIPHERAL VASCULAR DISEASE (HCC): Primary | ICD-10-CM

## 2024-07-15 PROCEDURE — 99212 OFFICE O/P EST SF 10 MIN: CPT | Performed by: SURGERY

## 2024-07-15 NOTE — PROGRESS NOTES
Identified pt with two pt identifiers (name and ). Reviewed chart in preparation for visit and have obtained necessary documentation.    Juliet Tatum is a 64 y.o. female  Chief Complaint   Patient presents with    Follow-up     Imaging      /73 (Site: Right Upper Arm, Position: Sitting, Cuff Size: Large Adult)   Pulse 75   Temp 98.2 °F (36.8 °C) (Oral)   Resp 17   Ht 1.626 m (5' 4\")   Wt 91.6 kg (202 lb)   SpO2 98%   BMI 34.67 kg/m²     1. Have you been to the ER, urgent care clinic since your last visit?  Hospitalized since your last visit?no    2. Have you seen or consulted any other health care providers outside of the [facility] since your last visit?  Include any pap smears or colon screening. no

## 2024-07-24 ENCOUNTER — TELEPHONE (OUTPATIENT)
Age: 64
End: 2024-07-24

## 2024-07-24 NOTE — TELEPHONE ENCOUNTER
Patient called today regarding up coming surgery. She said that Dr. Lindquist gave her the date of 8/13/24 but nothing is posted. Patient needs to make transportation arrangements and would like to be notified as soon as possible, please. [phone]

## 2024-07-25 NOTE — PROGRESS NOTES
Vascular History and Physical    Patient: Juliet Tatum  MRN: [id number]    YOB: 1960  Age: 64 y.o.  Sex: female     Chief Complaint:  Chief Complaint   Patient presents with    Follow-up     Imaging        History of Present Illness: Juliet Tatum is a 64 y.o. very pleasant woman is here today for vascular assessment.  Patient complaining of the right leg rest pain.  Patient had a previous right leg angioplasty.  Most recent LACEY examination shows right ankle index 0.29, left side 0.96.    Social History:  Social Connections: Not on file       Past Medical History:  Past Medical History:   Diagnosis Date    Arthritis     Family history of ischemic heart disease     PVD (peripheral vascular disease) ([facility])     Smoker     Swelling     ankles, hands    Thromboembolus ([facility])     left leg    Trigeminal neuralgia        Surgical History:  Past Surgical History:   Procedure Laterality Date    GASTRIC BYPASS SURGERY  1991    HYSTERECTOMY (CERVIX STATUS UNKNOWN)  2001    OTHER SURGICAL HISTORY Left 01/2023    angiogram with intervention       Allergies:  Allergies   Allergen Reactions    Tramadol Hives and Itching       Current Meds:  Current Outpatient Medications   Medication Sig Dispense Refill    naproxen (NAPROSYN) 250 MG tablet Take 1 tablet by mouth 2 times daily (with meals)      OXTELLAR  MG TB24 Takes 2 tablets      Cyanocobalamin (VITAMIN B-12) 1000 MCG SUBL DISSOLVE 1 TABLET UNDER THE TONGUE EVERY DAY      Cholecalciferol (VITAMIN D3) 1.25 MG (57727 UT) CAPS Take 1 capsule by mouth Once a week at 5 PM      clopidogrel (PLAVIX) 75 MG tablet Take 1 tablet by mouth daily 30 tablet 5     No current facility-administered medications for this visit.       Review of Systems:  I reviewed the rest of organ systems personally and they are negative.  Pertinent findings discussed in HPI.    Physical Examination    /73 (Site: Right Upper Arm, Position: Sitting, Cuff Size: Large Adult)   Pulse 75

## 2024-08-01 NOTE — TELEPHONE ENCOUNTER
Contacted patient to confirm surgery with Dr. Lindquist on 8/13. Notified patient of arrival time. Patient thanked me for the call.

## 2024-08-02 ENCOUNTER — TELEPHONE (OUTPATIENT)
Age: 64
End: 2024-08-02

## 2024-08-02 NOTE — TELEPHONE ENCOUNTER
Contacted patient two identifiers asked patient what prescription did she need refilled, she stated she needs her Plavix refilled. Informed patient that I will pass this information on to Dr. Lindquist. Messaged Dr. Lindquist in [facility] that patient needs this medication refilled.

## 2024-08-02 NOTE — TELEPHONE ENCOUNTER
Patient called in requesting to speak with a nurse for a RX refill.  Please contact pt to assist. Thanks DCR

## 2024-08-05 RX ORDER — CLOPIDOGREL BISULFATE 75 MG/1
75 TABLET ORAL DAILY
Qty: 90 TABLET | Refills: 1 | Status: SHIPPED | OUTPATIENT
Start: 2024-08-05

## 2024-08-12 ENCOUNTER — HOSPITAL ENCOUNTER (OUTPATIENT)
Facility: HOSPITAL | Age: 64
Discharge: HOME | End: 2024-08-15
Payer: MEDICAID

## 2024-08-12 LAB
ALBUMIN SERPL-MCNC: 3.5 G/DL (ref 3.5–5)
ALBUMIN/GLOB SERPL: 0.9 (ref 1.1–2.2)
ALP SERPL-CCNC: 80 U/L (ref 45–117)
ALT SERPL-CCNC: 20 U/L (ref 12–78)
ANION GAP SERPL CALC-SCNC: 5 MMOL/L (ref 5–15)
APTT PPP: 29.2 SEC (ref 21.2–34.1)
AST SERPL W P-5'-P-CCNC: 10 U/L (ref 15–37)
BILIRUB SERPL-MCNC: 0.3 MG/DL (ref 0.2–1)
BUN SERPL-MCNC: 7 MG/DL (ref 6–20)
BUN/CREAT SERPL: 11 (ref 12–20)
CA-I BLD-MCNC: 9 MG/DL (ref 8.5–10.1)
CHLORIDE SERPL-SCNC: 96 MMOL/L (ref 97–108)
CO2 SERPL-SCNC: 26 MMOL/L (ref 21–32)
CREAT SERPL-MCNC: 0.65 MG/DL (ref 0.55–1.02)
ERYTHROCYTE [DISTWIDTH] IN BLOOD BY AUTOMATED COUNT: 13.9 % (ref 11.5–14.5)
GLOBULIN SER CALC-MCNC: 4 G/DL (ref 2–4)
GLUCOSE SERPL-MCNC: 89 MG/DL (ref 65–100)
HCT VFR BLD AUTO: 34 % (ref 35–47)
HGB BLD-MCNC: 11.7 G/DL (ref 11.5–16)
INR PPP: 1 (ref 0.9–1.1)
MCH RBC QN AUTO: 28 PG (ref 26–34)
MCHC RBC AUTO-ENTMCNC: 34.4 G/DL (ref 30–36.5)
MCV RBC AUTO: 81.3 FL (ref 80–99)
NRBC # BLD: 0 K/UL (ref 0–0.01)
NRBC BLD-RTO: 0 PER 100 WBC
PLATELET # BLD AUTO: 325 K/UL (ref 150–400)
PMV BLD AUTO: 8.7 FL (ref 8.9–12.9)
POTASSIUM SERPL-SCNC: 4.2 MMOL/L (ref 3.5–5.1)
PROT SERPL-MCNC: 7.5 G/DL (ref 6.4–8.2)
PROTHROMBIN TIME: 12.9 SEC (ref 11.9–14.6)
RBC # BLD AUTO: 4.18 M/UL (ref 3.8–5.2)
SODIUM SERPL-SCNC: 127 MMOL/L (ref 136–145)
THERAPEUTIC RANGE: NORMAL SEC (ref 82–109)
WBC # BLD AUTO: 4.3 K/UL (ref 3.6–11)

## 2024-08-12 PROCEDURE — 85730 THROMBOPLASTIN TIME PARTIAL: CPT

## 2024-08-12 PROCEDURE — 85027 COMPLETE CBC AUTOMATED: CPT

## 2024-08-12 PROCEDURE — 85610 PROTHROMBIN TIME: CPT

## 2024-08-12 PROCEDURE — 80053 COMPREHEN METABOLIC PANEL: CPT

## 2024-08-12 PROCEDURE — 36415 COLL VENOUS BLD VENIPUNCTURE: CPT

## 2024-08-13 ENCOUNTER — HOSPITAL ENCOUNTER (OUTPATIENT)
Facility: HOSPITAL | Age: 64
Discharge: HOME | End: 2024-08-13
Attending: SURGERY | Admitting: SURGERY
Payer: MEDICAID

## 2024-08-13 VITALS
TEMPERATURE: 97.9 F | DIASTOLIC BLOOD PRESSURE: 77 MMHG | RESPIRATION RATE: 18 BRPM | OXYGEN SATURATION: 100 % | BODY MASS INDEX: 33.84 KG/M2 | WEIGHT: 198.2 LBS | HEART RATE: 64 BPM | HEIGHT: 64 IN | SYSTOLIC BLOOD PRESSURE: 156 MMHG

## 2024-08-13 DIAGNOSIS — I73.9 PVD (PERIPHERAL VASCULAR DISEASE) (HCC): ICD-10-CM

## 2024-08-13 PROBLEM — M79.606 LEG PAIN: Status: ACTIVE | Noted: 2024-08-13

## 2024-08-13 PROCEDURE — C1725 CATH, TRANSLUMIN NON-LASER: HCPCS | Performed by: SURGERY

## 2024-08-13 PROCEDURE — C1894 INTRO/SHEATH, NON-LASER: HCPCS | Performed by: SURGERY

## 2024-08-13 PROCEDURE — 6360000004 HC RX CONTRAST MEDICATION: Performed by: SURGERY

## 2024-08-13 PROCEDURE — 76937 US GUIDE VASCULAR ACCESS: CPT | Performed by: SURGERY

## 2024-08-13 PROCEDURE — 2500000003 HC RX 250 WO HCPCS: Performed by: SURGERY

## 2024-08-13 PROCEDURE — 2709999900 HC NON-CHARGEABLE SUPPLY: Performed by: SURGERY

## 2024-08-13 PROCEDURE — 7100000000 HC PACU RECOVERY - FIRST 15 MIN: Performed by: SURGERY

## 2024-08-13 PROCEDURE — C1714 CATH, TRANS ATHERECTOMY, DIR: HCPCS | Performed by: SURGERY

## 2024-08-13 PROCEDURE — 99152 MOD SED SAME PHYS/QHP 5/>YRS: CPT | Performed by: SURGERY

## 2024-08-13 PROCEDURE — C1884 EMBOLIZATION PROTECT SYST: HCPCS | Performed by: SURGERY

## 2024-08-13 PROCEDURE — 6360000002 HC RX W HCPCS: Performed by: SURGERY

## 2024-08-13 PROCEDURE — C1769 GUIDE WIRE: HCPCS | Performed by: SURGERY

## 2024-08-13 PROCEDURE — 75710 ARTERY X-RAYS ARM/LEG: CPT | Performed by: SURGERY

## 2024-08-13 PROCEDURE — C1760 CLOSURE DEV, VASC: HCPCS | Performed by: SURGERY

## 2024-08-13 PROCEDURE — C1887 CATHETER, GUIDING: HCPCS | Performed by: SURGERY

## 2024-08-13 PROCEDURE — C2623 CATH, TRANSLUMIN, DRUG-COAT: HCPCS | Performed by: SURGERY

## 2024-08-13 PROCEDURE — 7100000001 HC PACU RECOVERY - ADDTL 15 MIN: Performed by: SURGERY

## 2024-08-13 PROCEDURE — 37225 HC ATHERECTOMY FEM/POP: CPT | Performed by: SURGERY

## 2024-08-13 PROCEDURE — 75625 CONTRAST EXAM ABDOMINL AORTA: CPT | Performed by: SURGERY

## 2024-08-13 PROCEDURE — 99153 MOD SED SAME PHYS/QHP EA: CPT | Performed by: SURGERY

## 2024-08-13 PROCEDURE — 36245 INS CATH ABD/L-EXT ART 1ST: CPT | Performed by: SURGERY

## 2024-08-13 RX ORDER — SODIUM CHLORIDE 0.9 % (FLUSH) 0.9 %
5-40 SYRINGE (ML) INJECTION EVERY 12 HOURS SCHEDULED
Status: DISCONTINUED | OUTPATIENT
Start: 2024-08-13 | End: 2024-08-13 | Stop reason: HOSPADM

## 2024-08-13 RX ORDER — SODIUM CHLORIDE 9 MG/ML
INJECTION, SOLUTION INTRAVENOUS PRN
Status: DISCONTINUED | OUTPATIENT
Start: 2024-08-13 | End: 2024-08-13 | Stop reason: HOSPADM

## 2024-08-13 RX ORDER — HYDRALAZINE HYDROCHLORIDE 20 MG/ML
INJECTION INTRAMUSCULAR; INTRAVENOUS PRN
Status: DISCONTINUED | OUTPATIENT
Start: 2024-08-13 | End: 2024-08-13 | Stop reason: HOSPADM

## 2024-08-13 RX ORDER — HEPARIN SODIUM 1000 [USP'U]/ML
INJECTION, SOLUTION INTRAVENOUS; SUBCUTANEOUS PRN
Status: DISCONTINUED | OUTPATIENT
Start: 2024-08-13 | End: 2024-08-13 | Stop reason: HOSPADM

## 2024-08-13 RX ORDER — SODIUM CHLORIDE 9 MG/ML
INJECTION, SOLUTION INTRAVENOUS CONTINUOUS
Status: DISCONTINUED | OUTPATIENT
Start: 2024-08-13 | End: 2024-08-13 | Stop reason: HOSPADM

## 2024-08-13 RX ORDER — SODIUM CHLORIDE 0.9 % (FLUSH) 0.9 %
5-40 SYRINGE (ML) INJECTION PRN
Status: DISCONTINUED | OUTPATIENT
Start: 2024-08-13 | End: 2024-08-13 | Stop reason: HOSPADM

## 2024-08-13 RX ORDER — MIDAZOLAM HYDROCHLORIDE 1 MG/ML
INJECTION INTRAMUSCULAR; INTRAVENOUS PRN
Status: DISCONTINUED | OUTPATIENT
Start: 2024-08-13 | End: 2024-08-13 | Stop reason: HOSPADM

## 2024-08-13 RX ORDER — ACETAMINOPHEN 325 MG/1
650 TABLET ORAL EVERY 4 HOURS PRN
Status: DISCONTINUED | OUTPATIENT
Start: 2024-08-13 | End: 2024-08-13 | Stop reason: HOSPADM

## 2024-08-13 RX ORDER — HEPARIN SODIUM 200 [USP'U]/100ML
INJECTION, SOLUTION INTRAVENOUS CONTINUOUS PRN
Status: COMPLETED | OUTPATIENT
Start: 2024-08-13 | End: 2024-08-13

## 2024-08-13 RX ORDER — FENTANYL CITRATE 50 UG/ML
INJECTION, SOLUTION INTRAMUSCULAR; INTRAVENOUS PRN
Status: DISCONTINUED | OUTPATIENT
Start: 2024-08-13 | End: 2024-08-13 | Stop reason: HOSPADM

## 2024-08-13 RX ORDER — LIDOCAINE HYDROCHLORIDE 20 MG/ML
INJECTION, SOLUTION INFILTRATION; PERINEURAL PRN
Status: DISCONTINUED | OUTPATIENT
Start: 2024-08-13 | End: 2024-08-13 | Stop reason: HOSPADM

## 2024-08-13 ASSESSMENT — PAIN - FUNCTIONAL ASSESSMENT: PAIN_FUNCTIONAL_ASSESSMENT: 0-10

## 2024-08-13 NOTE — H&P
Final     Comment:    Pediatric calculator link: https://www.kidney.org/professionals/kdoqi/gfr_calculatorped    These results are not intended for use in patients <18 years of age.     eGFR results are calculated without a race factor using  the 2021 CKD-EPI equation. Careful clinical correlation is recommended, particularly when comparing to results calculated using previous equations.  The CKD-EPI equation is less accurate in patients with extremes of muscle mass, extra-renal metabolism of creatinine, excessive creatine ingestion, or following therapy that affects renal tubular secretion.       Calcium 07/11/2024 8.8  8.5 - 10.1 mg/dL Final    Total Bilirubin 07/11/2024 0.3  0.2 - 1.0 mg/dL Final    AST 07/11/2024 9 (L)  15 - 37 U/L Final    ALT 07/11/2024 16  12 - 78 U/L Final    Alk Phosphatase 07/11/2024 80  45 - 117 U/L Final    Total Protein 07/11/2024 6.4  6.4 - 8.2 g/dL Final    Albumin 07/11/2024 3.3 (L)  3.5 - 5.0 g/dL Final    Globulin 07/11/2024 3.1  2.0 - 4.0 g/dL Final    Albumin/Globulin Ratio 07/11/2024 1.1  1.1 - 2.2   Final    Sodium 07/10/2024 123 (L)  136 - 145 mmol/L Final     Investigated per delta check protocol    Potassium 07/10/2024 3.3 (L)  3.5 - 5.1 mmol/L Final    Chloride 07/10/2024 87 (L)  97 - 108 mmol/L Final    CO2 07/10/2024 29  21 - 32 mmol/L Final    Anion Gap 07/10/2024 7  5 - 15 mmol/L Final    Glucose 07/10/2024 143 (H)  65 - 100 mg/dL Final    BUN 07/10/2024 14  6 - 20 mg/dL Final    Creatinine 07/10/2024 0.85  0.55 - 1.02 mg/dL Final    BUN/Creatinine Ratio 07/10/2024 16  12 - 20   Final    Est, Glom Filt Rate 07/10/2024 76  >60 ml/min/1.73m2 Final     Comment:    Pediatric calculator link: https://www.kidney.org/professionals/kdoqi/gfr_calculatorped    These results are not intended for use in patients <18 years of age.     eGFR results are calculated without a race factor using  the 2021 CKD-EPI equation. Careful clinical correlation is recommended, particularly when

## 2024-08-14 NOTE — PROGRESS NOTES
Patient discharge home with her daughter.  Transportation care of her daughter.  NO IV, NO Phelps. Alert and oriented, stale.  Right groin dressing intact,, no bleeding.

## 2024-08-15 PROBLEM — I70.201 FEMORAL ARTERY STENOSIS, RIGHT (HCC): Status: ACTIVE | Noted: 2024-08-15

## 2024-08-15 PROBLEM — M79.604 LEG PAIN, ANTERIOR, RIGHT: Status: ACTIVE | Noted: 2024-08-13

## 2024-08-15 LAB — ECHO BSA: 2.01 M2

## 2024-08-26 ENCOUNTER — OFFICE VISIT (OUTPATIENT)
Age: 64
End: 2024-08-26
Payer: MEDICAID

## 2024-08-26 VITALS
OXYGEN SATURATION: 98 % | BODY MASS INDEX: 34.91 KG/M2 | WEIGHT: 204.5 LBS | SYSTOLIC BLOOD PRESSURE: 153 MMHG | HEART RATE: 66 BPM | TEMPERATURE: 98.2 F | HEIGHT: 64 IN | DIASTOLIC BLOOD PRESSURE: 83 MMHG

## 2024-08-26 DIAGNOSIS — I73.9 PERIPHERAL VASCULAR DISEASE (HCC): Primary | ICD-10-CM

## 2024-08-26 PROCEDURE — 99212 OFFICE O/P EST SF 10 MIN: CPT | Performed by: SURGERY

## 2024-08-26 ASSESSMENT — PATIENT HEALTH QUESTIONNAIRE - PHQ9
SUM OF ALL RESPONSES TO PHQ QUESTIONS 1-9: 0
2. FEELING DOWN, DEPRESSED OR HOPELESS: NOT AT ALL
SUM OF ALL RESPONSES TO PHQ QUESTIONS 1-9: 0
SUM OF ALL RESPONSES TO PHQ QUESTIONS 1-9: 0
1. LITTLE INTEREST OR PLEASURE IN DOING THINGS: NOT AT ALL
SUM OF ALL RESPONSES TO PHQ QUESTIONS 1-9: 0
SUM OF ALL RESPONSES TO PHQ9 QUESTIONS 1 & 2: 0

## 2024-08-26 NOTE — PROGRESS NOTES
Identified pt with two pt identifiers (name and ). Reviewed chart in preparation for visit and have obtained necessary documentation.    Juliet Tatum is a 64 y.o. female  Chief Complaint   Patient presents with    Post-Op Check     Right leg angiogram     BP (!) 153/83 (Site: Right Upper Arm, Position: Sitting, Cuff Size: Large Adult)   Pulse 66   Temp 98.2 °F (36.8 °C) (Oral)   Ht 1.626 m (5' 4\")   Wt 92.8 kg (204 lb 8 oz)   SpO2 98%   BMI 35.10 kg/m²     1. Have you been to the ER, urgent care clinic since your last visit?  Hospitalized since your last visit?yes - [facility]    2. Have you seen or consulted any other health care providers outside of the [facility] since your last visit?  Include any pap smears or colon screening. no

## 2024-10-28 ENCOUNTER — OFFICE VISIT (OUTPATIENT)
Age: 64
End: 2024-10-28
Payer: MEDICAID

## 2024-10-28 VITALS
SYSTOLIC BLOOD PRESSURE: 165 MMHG | WEIGHT: 208.5 LBS | BODY MASS INDEX: 35.59 KG/M2 | OXYGEN SATURATION: 98 % | DIASTOLIC BLOOD PRESSURE: 76 MMHG | RESPIRATION RATE: 18 BRPM | TEMPERATURE: 98.8 F | HEIGHT: 64 IN | HEART RATE: 77 BPM

## 2024-10-28 DIAGNOSIS — I73.9 PAD (PERIPHERAL ARTERY DISEASE) (HCC): Primary | ICD-10-CM

## 2024-10-28 PROCEDURE — 99212 OFFICE O/P EST SF 10 MIN: CPT | Performed by: SURGERY

## 2024-10-28 ASSESSMENT — PATIENT HEALTH QUESTIONNAIRE - PHQ9
SUM OF ALL RESPONSES TO PHQ9 QUESTIONS 1 & 2: 0
SUM OF ALL RESPONSES TO PHQ QUESTIONS 1-9: 0
2. FEELING DOWN, DEPRESSED OR HOPELESS: NOT AT ALL
SUM OF ALL RESPONSES TO PHQ QUESTIONS 1-9: 0
SUM OF ALL RESPONSES TO PHQ QUESTIONS 1-9: 0
1. LITTLE INTEREST OR PLEASURE IN DOING THINGS: NOT AT ALL
SUM OF ALL RESPONSES TO PHQ QUESTIONS 1-9: 0

## 2024-10-28 NOTE — PROGRESS NOTES
Identified patient with two patient identifiers (name and ). Reviewed chart in preparation for visit and have obtained necessary documentation.    Juliet Tatum is a 64 y.o. female  Chief Complaint   Patient presents with    Follow-up     PVD     BP (!) 165/76 (Site: Right Upper Arm, Position: Sitting, Cuff Size: Large Adult)   Pulse 77   Temp 98.8 °F (37.1 °C) (Oral)   Resp 18   Ht 1.626 m (5' 4\")   Wt 94.6 kg (208 lb 8 oz)   SpO2 98%   BMI 35.79 kg/m²     1. Have you been to the ER, urgent care clinic since your last visit?  Hospitalized since your last visit?no    2. Have you seen or consulted any other health care providers outside of the [facility] since your last visit?  Include any pap smears or colon screening. yes   Patient and provider made aware of elevated BP x2. Patient asymptomatic. Patient reminded to monitor BP, continue to take BP medications if prescribed, and follow up with PCP/Cardiologist.  Patient expressed understanding and agreement.

## 2024-10-31 NOTE — PROGRESS NOTES
Vascular History and Physical    Patient: Juliet Tatum  MRN: [id number]    YOB: 1960  Age: 64 y.o.  Sex: female     Chief Complaint:  Chief Complaint   Patient presents with    Follow-up     PVD       History of Present Illness: Juliet Tatum is a 64 y.o. very pleasant woman is here today for surveillance vascular examination.  Patient had a previous right leg angioplasty.  She is doing well.  Pain is improved.  Patient denies any chest pain shortness of breath.    Social History:  Social Connections: Not on file       Past Medical History:  Past Medical History:   Diagnosis Date    Arthritis     Family history of ischemic heart disease     Prolonged emergence from general anesthesia     PVD (peripheral vascular disease) ([facility])     Smoker     Swelling     ankles, hands    Thromboembolus (HCC)     left leg    Trigeminal neuralgia        Surgical History:  Past Surgical History:   Procedure Laterality Date    COLONOSCOPY      GASTRIC BYPASS SURGERY  1991    HYSTERECTOMY (CERVIX STATUS UNKNOWN)  2001    INVASIVE VASCULAR N/A 8/13/2024    Angiography lower ext right performed by Chandana Lindquist MD at [facility]    INVASIVE VASCULAR N/A 8/13/2024    Aortagram abdominal performed by Chandana Lindquist MD at [facility]    INVASIVE VASCULAR N/A 8/13/2024    Ultrasound guided vascular access performed by Chandana Lindquist MD at [facility]    INVASIVE VASCULAR N/A 8/13/2024    Angioplasty superficial femoral artery performed by Chandana Lindquist MD at [facility]    INVASIVE VASCULAR N/A 8/13/2024    Atherectomy  femoral popliteal performed by Chandana Lindquist MD at [facility]    OTHER SURGICAL HISTORY Bilateral     angiogram with intervention       Allergies:  Allergies   Allergen Reactions    Tramadol Hives and Itching       Current Meds:  Current Outpatient Medications   Medication Sig Dispense Refill    magnesium gluconate (MAGONATE) 500 MG tablet Take 1

## 2025-01-29 ENCOUNTER — HOSPITAL ENCOUNTER (EMERGENCY)
Facility: HOSPITAL | Age: 65
Discharge: HOME | End: 2025-01-29
Attending: EMERGENCY MEDICINE
Payer: COMMERCIAL

## 2025-01-29 VITALS
OXYGEN SATURATION: 98 % | RESPIRATION RATE: 18 BRPM | SYSTOLIC BLOOD PRESSURE: 132 MMHG | DIASTOLIC BLOOD PRESSURE: 58 MMHG | BODY MASS INDEX: 33.63 KG/M2 | TEMPERATURE: 98 F | WEIGHT: 197 LBS | HEIGHT: 64 IN | HEART RATE: 90 BPM

## 2025-01-29 DIAGNOSIS — N30.01 ACUTE CYSTITIS WITH HEMATURIA: Primary | ICD-10-CM

## 2025-01-29 LAB
APPEARANCE UR: ABNORMAL
BACTERIA URNS QL MICRO: ABNORMAL /HPF
BILIRUB UR QL: NEGATIVE
COLOR UR: YELLOW
EPITH CASTS URNS QL MICRO: ABNORMAL /LPF
GLUCOSE UR STRIP.AUTO-MCNC: NEGATIVE MG/DL
HGB UR QL STRIP: ABNORMAL
KETONES UR QL STRIP.AUTO: NEGATIVE MG/DL
LEUKOCYTE ESTERASE UR QL STRIP.AUTO: ABNORMAL
NITRITE UR QL STRIP.AUTO: NEGATIVE
PH UR STRIP: 6 (ref 5–8)
PROT UR STRIP-MCNC: ABNORMAL MG/DL
RBC #/AREA URNS HPF: ABNORMAL /HPF (ref 0–5)
SP GR UR REFRACTOMETRY: 1.01 (ref 1–1.03)
URINE CULTURE IF INDICATED: ABNORMAL
UROBILINOGEN UR QL STRIP.AUTO: 0.1 EU/DL (ref 0.2–1)
WBC URNS QL MICRO: ABNORMAL /HPF (ref 0–4)

## 2025-01-29 PROCEDURE — 99283 EMERGENCY DEPT VISIT LOW MDM: CPT

## 2025-01-29 PROCEDURE — 81001 URINALYSIS AUTO W/SCOPE: CPT

## 2025-01-29 PROCEDURE — 87088 URINE BACTERIA CULTURE: CPT

## 2025-01-29 PROCEDURE — 87186 SC STD MICRODIL/AGAR DIL: CPT

## 2025-01-29 PROCEDURE — 87086 URINE CULTURE/COLONY COUNT: CPT

## 2025-01-29 RX ORDER — CEPHALEXIN 500 MG/1
500 CAPSULE ORAL 3 TIMES DAILY
Qty: 21 CAPSULE | Refills: 0 | Status: SHIPPED | OUTPATIENT
Start: 2025-01-29 | End: 2025-02-05

## 2025-01-29 ASSESSMENT — PAIN DESCRIPTION - DESCRIPTORS: DESCRIPTORS: DULL

## 2025-01-29 ASSESSMENT — PAIN SCALES - GENERAL: PAINLEVEL_OUTOF10: 6

## 2025-01-29 ASSESSMENT — PAIN DESCRIPTION - FREQUENCY: FREQUENCY: INTERMITTENT

## 2025-01-29 ASSESSMENT — PAIN - FUNCTIONAL ASSESSMENT: PAIN_FUNCTIONAL_ASSESSMENT: 0-10

## 2025-01-29 NOTE — ED TRIAGE NOTES
Started Monday  Noticed some blood in urine  No dysuria  Today dark blood noted.  Pain in bladder area dull  Urinary frequency

## 2025-01-29 NOTE — ED PROVIDER NOTES
Psychiatric:         Mood and Affect: Mood normal.           SCREENINGS                No data recorded    LAB, EKG AND DIAGNOSTIC RESULTS   Labs:  Recent Results (from the past 12 hour(s))   Urinalysis with Reflex to Culture    Collection Time: 01/29/25  3:41 PM    Specimen: Urine   Result Value Ref Range    Color, UA Yellow      Appearance Cloudy (A) Clear      Specific Gravity, UA 1.015 1.003 - 1.030      pH, Urine 6.0 5.0 - 8.0      Protein, UA Trace (A) Negative mg/dL    Glucose, Ur Negative Negative mg/dL    Ketones, Urine Negative Negative mg/dL    Bilirubin, Urine Negative Negative      Blood, Urine Large (A) Negative      Urobilinogen, Urine 0.1 (L) 0.2 - 1.0 EU/dL    Nitrite, Urine Negative Negative      Leukocyte Esterase, Urine Large (A) Negative      WBC, UA PENDING /hpf    RBC, UA PENDING /hpf    Epithelial Cells, UA PENDING /lpf    BACTERIA, URINE PENDING /hpf    Urine Culture if Indicated PENDING        EKG:.Not Applicable    Radiologic Studies:  Non-plain film images such as CT, Ultrasound and MRI are read by the radiologist. Plain radiographic images are visualized and preliminarily interpreted by the ED Provider with the following findings: Not Applicable.    Interpretation per the Radiologist below, if available at the time of this note:  No orders to display        ED COURSE and DIFFERENTIAL DIAGNOSIS/MDM   3:49 PM Differential and Considerations: Patient was presents with dysuria.  DDx: Acute cystitis, pyleonephritis, ureteral stone, yeast infection, STI.  Will obtain UA to start.  Will consider labwork and admission if indicated based on story and vitals.     Discussed with the patient diagnosis and test results and all questioned fully answered.  They understand the importance of staying well hydrated, taking antibiotics as prescribed to completion and using OTC pyridium as desired.  She will PCP if any problems arise.       Records Reviewed (source and summary of external notes): Prior

## 2025-01-31 LAB
BACTERIA SPEC CULT: ABNORMAL
COLONY COUNT, CNT: ABNORMAL
Lab: ABNORMAL

## 2025-02-03 RX ORDER — CLOPIDOGREL BISULFATE 75 MG/1
75 TABLET ORAL DAILY
Qty: 90 TABLET | Refills: 1 | Status: SHIPPED | OUTPATIENT
Start: 2025-02-03

## 2025-02-26 NOTE — ED PROVIDER NOTES
EMERGENCY DEPARTMENT HISTORY AND PHYSICAL EXAM    Date: 7/8/2023  Patient Name: Nora Cee    History of Presenting Illness     Chief Complaint   Patient presents with    Medication Refill     trigeminal neuralgia       History Provided By: Patient    HPI: Nora Cee, 61 y.o. female   presents to the ED with cc of medication refill. Patient came to emergency room requesting a medication refill for the medicine she has been taking for years for trigeminal neuralgia. Patient states that she contact her doctor multiple times and was unable to contact him. Pharmacy has given her 4-day emergency supply. Patient states that she take Trileptal 150 mg 4 times a day. Patient complains of right-sided facial \"shooting pain\". No fever or chills. No chest pain or shortness of breath. PCP: Alex Pelaez MD    No current facility-administered medications on file prior to encounter.      Current Outpatient Medications on File Prior to Encounter   Medication Sig Dispense Refill    cilostazol (PLETAL) 50 MG tablet Take 1 tablet by mouth 2 times daily (before meals) 60 tablet 0    clopidogrel (PLAVIX) 75 MG tablet Take 1 tablet by mouth daily      diclofenac (VOLTAREN) 75 MG EC tablet Take 1 tablet by mouth 2 times daily      hydroCHLOROthiazide (HYDRODIURIL) 25 MG tablet Take 1 tablet by mouth daily         Past History     Past Medical History:  Past Medical History:   Diagnosis Date    Arthritis     Family history of ischemic heart disease     PVD (peripheral vascular disease) ([address])     Smoker     Swelling     ankles, hands    Thromboembolus ([facility])     left leg    Trigeminal neuralgia        Past Surgical History:  Past Surgical History:   Procedure Laterality Date    GASTRIC BYPASS SURGERY  1991    HYSTERECTOMY (CERVIX STATUS UNKNOWN)  2001    OTHER SURGICAL HISTORY Left 01/2023    angiogram with intervention       Family History:  Family History   Problem Relation Age of Onset    Heart Attack Father     Heart Clofazimine Pregnancy And Lactation Text: This medication is Pregnancy Category C and isn't considered safe during pregnancy. It is excreted in breast milk. Topical Ketoconazole Pregnancy And Lactation Text: This medication is Pregnancy Category B and is considered safe during pregnancy. It is unknown if it is excreted in breast milk. Infliximab Counseling:  I discussed with the patient the risks of infliximab including but not limited to myelosuppression, immunosuppression, autoimmune hepatitis, demyelinating diseases, lymphoma, and serious infections.  The patient understands that monitoring is required including a PPD at baseline and must alert us or the primary physician if symptoms of infection or other concerning signs are noted. Enbrel Pregnancy And Lactation Text: This medication is Pregnancy Category B and is considered safe during pregnancy. It is unknown if this medication is excreted in breast milk. Prednisone Counseling:  I discussed with the patient the risks of prolonged use of prednisone including but not limited to weight gain, insomnia, osteoporosis, mood changes, diabetes, susceptibility to infection, glaucoma and high blood pressure.  In cases where prednisone use is prolonged, patients should be monitored with blood pressure checks, serum glucose levels and an eye exam.  Additionally, the patient may need to be placed on GI prophylaxis, PCP prophylaxis, and calcium and vitamin D supplementation and/or a bisphosphonate.  The patient verbalized understanding of the proper use and the possible adverse effects of prednisone.  All of the patient's questions and concerns were addressed. Ivermectin Pregnancy And Lactation Text: This medication is Pregnancy Category C and it isn't known if it is safe during pregnancy. It is also excreted in breast milk. Wegovy Pregnancy And Lactation Text: The fetal risk of this medication is unknown and taking while pregnant is not recommended. It is unknown if this medication is present in breast milk. Doxycycline Pregnancy And Lactation Text: This medication is Pregnancy Category D and not consider safe during pregnancy. It is also excreted in breast milk but is considered safe for shorter treatment courses. Otezla Pregnancy And Lactation Text: This medication is Pregnancy Category C and it isn't known if it is safe during pregnancy. It is unknown if it is excreted in breast milk. Eucrisa Pregnancy And Lactation Text: This medication has not been assigned a Pregnancy Risk Category but animal studies failed to show danger with the topical medication. It is unknown if the medication is excreted in breast milk. Xolair Pregnancy And Lactation Text: This medication is Pregnancy Category B and is considered safe during pregnancy. This medication is excreted in breast milk. Calcipotriene Counseling:  I discussed with the patient the risks of calcipotriene including but not limited to erythema, scaling, itching, and irritation. Dutasteride Female Counseling: Dutasteride Counseling:  I discussed with the patient the risks of use of dutasteride including but not limited to decreased libido and sexual dysfunction. Explained the teratogenic nature of the medication and stressed the importance of not getting pregnant during treatment. All of the patient's questions and concerns were addressed. Tetracycline Counseling: Patient counseled regarding possible photosensitivity and increased risk for sunburn.  Patient instructed to avoid sunlight, if possible.  When exposed to sunlight, patients should wear protective clothing, sunglasses, and sunscreen.  The patient was instructed to call the office immediately if the following severe adverse effects occur:  hearing changes, easy bruising/bleeding, severe headache, or vision changes.  The patient verbalized understanding of the proper use and possible adverse effects of tetracycline.  All of the patient's questions and concerns were addressed. Patient understands to avoid pregnancy while on therapy due to potential birth defects. Bactrim Counseling:  I discussed with the patient the risks of sulfa antibiotics including but not limited to GI upset, allergic reaction, drug rash, diarrhea, dizziness, photosensitivity, and yeast infections.  Rarely, more serious reactions can occur including but not limited to aplastic anemia, agranulocytosis, methemoglobinemia, blood dyscrasias, liver or kidney failure, lung infiltrates or desquamative/blistering drug rashes. Rinvoq Counseling: I discussed with the patient the risks of Rinvoq therapy including but not limited to upper respiratory tract infections, shingles, cold sores, bronchitis, nausea, cough, fever, acne, and headache. Live vaccines should be avoided.  This medication has been linked to serious infections; higher rate of mortality; malignancy and lymphoproliferative disorders; major adverse cardiovascular events; thrombosis; thrombocytopenia, anemia, and neutropenia; lipid elevations; liver enzyme elevations; and gastrointestinal perforations. Oxybutynin Counseling:  I discussed with the patient the risks of oxybutynin including but not limited to skin rash, drowsiness, dry mouth, difficulty urinating, and blurred vision. Glycopyrrolate Counseling:  I discussed with the patient the risks of glycopyrrolate including but not limited to skin rash, drowsiness, dry mouth, difficulty urinating, and blurred vision. Prednisone Pregnancy And Lactation Text: This medication is Pregnancy Category C and it isn't know if it is safe during pregnancy. This medication is excreted in breast milk. Erythromycin Counseling:  I discussed with the patient the risks of erythromycin including but not limited to GI upset, allergic reaction, drug rash, diarrhea, increase in liver enzymes, and yeast infections. Calcipotriene Pregnancy And Lactation Text: The use of this medication during pregnancy or lactation is not recommended as there is insufficient data. Zepbound Counseling: I reviewed the possible side effects including: thyroid tumors, kidney disease, gallbladder disease, abdominal pain, constipation, diarrhea, nausea, vomiting and pancreatitis. Do not take this medication if you have a history or family history of multiple endocrine neoplasia syndrome type 2. Side effects reviewed, pt to contact office should one occur. Humira Counseling:  I discussed with the patient the risks of adalimumab including but not limited to myelosuppression, immunosuppression, autoimmune hepatitis, demyelinating diseases, lymphoma, and serious infections.  The patient understands that monitoring is required including a PPD at baseline and must alert us or the primary physician if symptoms of infection or other concerning signs are noted. Topical Metronidazole Counseling: Metronidazole is a topical antibiotic medication. You may experience burning, stinging, redness, or allergic reactions.  Please call our office if you develop any problems from using this medication. Bimzelx Pregnancy And Lactation Text: This medication crosses the placenta and the safety is uncertain during pregnancy. It is unknown if this medication is present in breast milk. Griseofulvin Counseling:  I discussed with the patient the risks of griseofulvin including but not limited to photosensitivity, cytopenia, liver damage, nausea/vomiting and severe allergy.  The patient understands that this medication is best absorbed when taken with a fatty meal (e.g., ice cream or french fries). Hydroquinone Counseling:  Patient advised that medication may result in skin irritation, lightening (hypopigmentation), dryness, and burning.  In the event of skin irritation, the patient was advised to reduce the amount of the drug applied or use it less frequently.  Rarely, spots that are treated with hydroquinone can become darker (pseudoochronosis).  Should this occur, patient instructed to stop medication and call the office. The patient verbalized understanding of the proper use and possible adverse effects of hydroquinone.  All of the patient's questions and concerns were addressed. Tetracycline Pregnancy And Lactation Text: This medication is Pregnancy Category D and not consider safe during pregnancy. It is also excreted in breast milk. Rinvoq Pregnancy And Lactation Text: Based on animal studies, Rinvoq may cause embryo-fetal harm when administered to pregnant women.  The medication should not be used in pregnancy.  Breastfeeding is not recommended during treatment and for 6 days after the last dose. Dutasteride Pregnancy And Lactation Text: This medication is absolutely contraindicated in women, especially during pregnancy and breast feeding. Feminization of male fetuses is possible if taking while pregnant. Bactrim Pregnancy And Lactation Text: This medication is Pregnancy Category D and is known to cause fetal risk.  It is also excreted in breast milk. Nemluvio Counseling: I discussed with the patient the risks of nemolizumab including but not limited to headache, gastrointestinal complaints, nasopharyngitis, musculoskeletal complaints, injection site reactions, and allergic reactions. The patient understands that monitoring is required and they must alert us or the primary physician if any side effects are noted. Glycopyrrolate Pregnancy And Lactation Text: This medication is Pregnancy Category B and is considered safe during pregnancy. It is unknown if it is excreted breast milk. Erythromycin Pregnancy And Lactation Text: This medication is Pregnancy Category B and is considered safe during pregnancy. It is also excreted in breast milk. Cantharidin Counseling:  I discussed with the patient the risks of Cantharidin including but not limited to pain, redness, burning, itching, and blistering. Topical Metronidazole Pregnancy And Lactation Text: This medication is Pregnancy Category B and considered safe during pregnancy.  It is also considered safe to use while breastfeeding. Griseofulvin Pregnancy And Lactation Text: This medication is Pregnancy Category X and is known to cause serious birth defects. It is unknown if this medication is excreted in breast milk but breast feeding should be avoided. Opioid Counseling: I discussed with the patient the potential side effects of opioids including but not limited to addiction, altered mental status, and depression. I stressed avoiding alcohol, benzodiazepines, muscle relaxants and sleep aids unless specifically okayed by a physician. The patient verbalized understanding of the proper use and possible adverse effects of opioids. All of the patient's questions and concerns were addressed. They were instructed to flush the remaining pills down the toilet if they did not need them for pain. Xeljanz Counseling: I discussed with the patient the risks of Xeljanz therapy including increased risk of infection, liver issues, headache, diarrhea, or cold symptoms. Live vaccines should be avoided. They were instructed to call if they have any problems. Simponi Counseling:  I discussed with the patient the risks of golimumab including but not limited to myelosuppression, immunosuppression, autoimmune hepatitis, demyelinating diseases, lymphoma, and serious infections.  The patient understands that monitoring is required including a PPD at baseline and must alert us or the primary physician if symptoms of infection or other concerning signs are noted. Finasteride Male Counseling: Finasteride Counseling:  I discussed with the patient the risks of use of finasteride including but not limited to decreased libido, decreased ejaculate volume, gynecomastia, and depression. Women should not handle medication.  All of the patient's questions and concerns were addressed. Nemluvio Pregnancy And Lactation Text: It is not known if Nemluvio causes fetal harm or is present in breast milk. Please proceed with caution if patients who are pregnant or breastfeeding. Hydroxychloroquine Counseling:  I discussed with the patient that a baseline ophthalmologic exam is needed at the start of therapy and every year thereafter while on therapy. A CBC may also be warranted for monitoring.  The side effects of this medication were discussed with the patient, including but not limited to agranulocytosis, aplastic anemia, seizures, rashes, retinopathy, and liver toxicity. Patient instructed to call the office should any adverse effect occur.  The patient verbalized understanding of the proper use and possible adverse effects of Plaquenil.  All the patient's questions and concerns were addressed. Imiquimod Counseling:  I discussed with the patient the risks of imiquimod including but not limited to erythema, scaling, itching, weeping, crusting, and pain.  Patient understands that the inflammatory response to imiquimod is variable from person to person and was educated regarded proper titration schedule.  If flu-like symptoms develop, patient knows to discontinue the medication and contact us. Adbry Pregnancy And Lactation Text: It is unknown if this medication will adversely affect pregnancy or breast feeding. Itraconazole Counseling:  I discussed with the patient the risks of itraconazole including but not limited to liver damage, nausea/vomiting, neuropathy, and severe allergy.  The patient understands that this medication is best absorbed when taken with acidic beverages such as non-diet cola or ginger ale.  The patient understands that monitoring is required including baseline LFTs and repeat LFTs at intervals.  The patient understands that they are to contact us or the primary physician if concerning signs are noted. Propranolol Counseling:  I discussed with the patient the risks of propranolol including but not limited to low heart rate, low blood pressure, low blood sugar, restlessness and increased cold sensitivity. They should call the office if they experience any of these side effects. Topical Steroids Counseling: I discussed with the patient that prolonged use of topical steroids can result in the increased appearance of superficial blood vessels (telangiectasias), lightening (hypopigmentation) and thinning of the skin (atrophy).  Patient understands to avoid using high potency steroids in skin folds, the groin or the face.  The patient verbalized understanding of the proper use and possible adverse effects of topical steroids.  All of the patient's questions and concerns were addressed. Metronidazole Counseling:  I discussed with the patient the risks of metronidazole including but not limited to seizures, nausea/vomiting, a metallic taste in the mouth, nausea/vomiting and severe allergy. Opioid Pregnancy And Lactation Text: These medications can lead to premature delivery and should be avoided during pregnancy. These medications are also present in breast milk in small amounts. 5-Fu Counseling: 5-Fluorouracil Counseling:  I discussed with the patient the risks of 5-fluorouracil including but not limited to erythema, scaling, itching, weeping, crusting, and pain. Hydroxyzine Pregnancy And Lactation Text: This medication is not safe during pregnancy and should not be taken. It is also excreted in breast milk and breast feeding isn't recommended. Simponi Pregnancy And Lactation Text: The risk during pregnancy and breastfeeding is uncertain with this medication. Xelkyreez Pregnancy And Lactation Text: This medication is Pregnancy Category D and is not considered safe during pregnancy.  The risk during breast feeding is also uncertain. Finasteride Female Counseling: Finasteride Counseling:  I discussed with the patient the risks of use of finasteride including but not limited to decreased libido and sexual dysfunction. Explained the teratogenic nature of the medication and stressed the importance of not getting pregnant during treatment. All of the patient's questions and concerns were addressed. Opzelura Pregnancy And Lactation Text: There is insufficient data to evaluate drug-associated risk for major birth defects, miscarriage, or other adverse maternal or fetal outcomes.  There is a pregnancy registry that monitors pregnancy outcomes in pregnant persons exposed to the medication during pregnancy.  It is unknown if this medication is excreted in breast milk.  Do not breastfeed during treatment and for about 4 weeks after the last dose. Dupixent Pregnancy And Lactation Text: This medication likely crosses the placenta but the risk for the fetus is uncertain. This medication is excreted in breast milk. Olanzapine Pregnancy And Lactation Text: This medication is pregnancy category C.   There are no adequate and well controlled trials with olanzapine in pregnant females.  Olanzapine should be used during pregnancy only if the potential benefit justifies the potential risk to the fetus.   In a study in lactating healthy women, olanzapine was excreted in breast milk.  It is recommended that women taking olanzapine should not breast feed. Cephalexin Pregnancy And Lactation Text: This medication is Pregnancy Category B and considered safe during pregnancy.  It is also excreted in breast milk but can be used safely for shorter doses. Rifampin Counseling: I discussed with the patient the risks of rifampin including but not limited to liver damage, kidney damage, red-orange body fluids, nausea/vomiting and severe allergy. Tazorac Pregnancy And Lactation Text: This medication is not safe during pregnancy. It is unknown if this medication is excreted in breast milk. Benzoyl Peroxide Counseling: Patient counseled that medicine may cause skin irritation and bleach clothing.  In the event of skin irritation, the patient was advised to reduce the amount of the drug applied or use it less frequently.   The patient verbalized understanding of the proper use and possible adverse effects of benzoyl peroxide.  All of the patient's questions and concerns were addressed. Drysol Pregnancy And Lactation Text: This medication is considered safe during pregnancy and breast feeding. Litfulo Counseling: I discussed with the patient the risks of Litfulo therapy including but not limited to upper respiratory tract infections, shingles, cold sores, and nausea. Live vaccines should be avoided.  This medication has been linked to serious infections; higher rate of mortality; malignancy and lymphoproliferative disorders; major adverse cardiovascular events; thrombosis; gastrointestinal perforations; neutropenia; lymphopenia; anemia; liver enzyme elevations; and lipid elevations. Albendazole Counseling:  I discussed with the patient the risks of albendazole including but not limited to cytopenia, kidney damage, nausea/vomiting and severe allergy.  The patient understands that this medication is being used in an off-label manner. Dapsone Counseling: I discussed with the patient the risks of dapsone including but not limited to hemolytic anemia, agranulocytosis, rashes, methemoglobinemia, kidney failure, peripheral neuropathy, headaches, GI upset, and liver toxicity.  Patients who start dapsone require monitoring including baseline LFTs and weekly CBCs for the first month, then every month thereafter.  The patient verbalized understanding of the proper use and possible adverse effects of dapsone.  All of the patient's questions and concerns were addressed. Ebglyss Counseling: I discussed with the patient the risks of lebrikizumab including but not limited to eye inflammation and irritation, cold sores, injection site reactions, allergic reactions and increased risk of parasitic infection. The patient understands that monitoring is required and they must alert us or the primary physician if symptoms of infection or other concerning signs are noted. Sotyktu Counseling:  I discussed the most common side effects of Sotyktu including: common cold, sore throat, sinus infections, cold sores, canker sores, folliculitis, and acne.  I also discussed more serious side effects of Sotyktu including but not limited to: serious allergic reactions; increased risk for infections such as TB; cancers such as lymphomas; rhabdomyolysis and elevated CPK; and elevated triglycerides and liver enzymes.  Tremfya Counseling: I discussed with the patient the risks of guselkumab including but not limited to immunosuppression, serious infections, and drug reactions.  The patient understands that monitoring is required including a PPD at baseline and must alert us or the primary physician if symptoms of infection or other concerning signs are noted. Oral Minoxidil Counseling- I discussed with the patient the risks of oral minoxidil including but not limited to shortness of breath, swelling of the feet or ankles, dizziness, lightheadedness, unwanted hair growth and allergic reaction.  The patient verbalized understanding of the proper use and possible adverse effects of oral minoxidil.  All of the patient's questions and concerns were addressed. Clindamycin Counseling: I counseled the patient regarding use of clindamycin as an antibiotic for prophylactic and/or therapeutic purposes. Clindamycin is active against numerous classes of bacteria, including skin bacteria. Side effects may include nausea, diarrhea, gastrointestinal upset, rash, hives, yeast infections, and in rare cases, colitis. Arava Counseling:  Patient counseled regarding adverse effects of Arava including but not limited to nausea, vomiting, abnormalities in liver function tests. Patients may develop mouth sores, rash, diarrhea, and abnormalities in blood counts. The patient understands that monitoring is required including LFTs and blood counts.  There is a rare possibility of scarring of the liver and lung problems that can occur when taking methotrexate. Persistent nausea, loss of appetite, pale stools, dark urine, cough, and shortness of breath should be reported immediately. Patient advised to discontinue Arava treatment and consult with a physician prior to attempting conception. The patient will have to undergo a treatment to eliminate Arava from the body prior to conception. Rifampin Pregnancy And Lactation Text: This medication is Pregnancy Category C and it isn't know if it is safe during pregnancy. It is also excreted in breast milk and should not be used if you are breast feeding. Topical Clindamycin Counseling: Patient counseled that this medication may cause skin irritation or allergic reactions.  In the event of skin irritation, the patient was advised to reduce the amount of the drug applied or use it less frequently.   The patient verbalized understanding of the proper use and possible adverse effects of clindamycin.  All of the patient's questions and concerns were addressed. Benzoyl Peroxide Pregnancy And Lactation Text: This medication is Pregnancy Category C. It is unknown if benzoyl peroxide is excreted in breast milk. Elidel Counseling: Patient may experience a mild burning sensation during topical application. Elidel is not approved in children less than 2 years of age. There have been case reports of hematologic and skin malignancies in patients using topical calcineurin inhibitors although causality is questionable. Semaglutide Counseling: I reviewed the possible side effects including: thyroid tumors, kidney disease, gallbladder disease, abdominal pain, constipation, diarrhea, nausea, vomiting and pancreatitis. Do not take this medication if you have a history or family history of multiple endocrine neoplasia syndrome type 2. Side effects reviewed, pt to contact office should one occur. Litfulo Pregnancy And Lactation Text: Based on animal studies, Lifulo may cause embryo-fetal harm when administered to pregnant women.  The medication should not be used in pregnancy.  Breastfeeding is not recommended during treatment. Ilumya Counseling: I discussed with the patient the risks of tildrakizumab including but not limited to immunosuppression, malignancy, posterior leukoencephalopathy syndrome, and serious infections.  The patient understands that monitoring is required including a PPD at baseline and must alert us or the primary physician if symptoms of infection or other concerning signs are noted. Dapsone Pregnancy And Lactation Text: This medication is Pregnancy Category C and is not considered safe during pregnancy or breast feeding. Arava Pregnancy And Lactation Text: This medication is Pregnancy Category X and is absolutely contraindicated during pregnancy. It is unknown if it is excreted in breast milk. Methotrexate Counseling:  Patient counseled regarding adverse effects of methotrexate including but not limited to nausea, vomiting, abnormalities in liver function tests. Patients may develop mouth sores, rash, diarrhea, and abnormalities in blood counts. The patient understands that monitoring is required including LFT's and blood counts.  There is a rare possibility of scarring of the liver and lung problems that can occur when taking methotrexate. Persistent nausea, loss of appetite, pale stools, dark urine, cough, and shortness of breath should be reported immediately. Patient advised to discontinue methotrexate treatment at least three months before attempting to become pregnant.  I discussed the need for folate supplements while taking methotrexate.  These supplements can decrease side effects during methotrexate treatment. The patient verbalized understanding of the proper use and possible adverse effects of methotrexate.  All of the patient's questions and concerns were addressed. Ebglyss Pregnancy And Lactation Text: This medication likely crosses the placenta but the risk for the fetus is uncertain. It is unknown if this medication is excreted in breast milk. Sotyktu Pregnancy And Lactation Text: There is insufficient data to evaluate whether or not Sotyktu is safe to use during pregnancy.   It is not known if Sotyktu passes into breast milk and whether or not it is safe to use when breastfeeding.   Clindamycin Pregnancy And Lactation Text: This medication can be used in pregnancy if certain situations. Clindamycin is also present in breast milk. Cimzia Counseling:  I discussed with the patient the risks of Cimzia including but not limited to immunosuppression, allergic reactions and infections.  The patient understands that monitoring is required including a PPD at baseline and must alert us or the primary physician if symptoms of infection or other concerning signs are noted. Sarecycline Counseling: Patient advised regarding possible photosensitivity and discoloration of the teeth, skin, lips, tongue and gums.  Patient instructed to avoid sunlight, if possible.  When exposed to sunlight, patients should wear protective clothing, sunglasses, and sunscreen.  The patient was instructed to call the office immediately if the following severe adverse effects occur:  hearing changes, easy bruising/bleeding, severe headache, or vision changes.  The patient verbalized understanding of the proper use and possible adverse effects of sarecycline.  All of the patient's questions and concerns were addressed. Oral Minoxidil Pregnancy And Lactation Text: This medication should only be used when clearly needed if you are pregnant, attempting to become pregnant or breast feeding. Elidel Pregnancy And Lactation Text: This medication is Pregnancy Category C. It is unknown if this medication is excreted in breast milk. Carac Counseling:  I discussed with the patient the risks of Carac including but not limited to erythema, scaling, itching, weeping, crusting, and pain. Gabapentin Counseling: I discussed with the patient the risks of gabapentin including but not limited to dizziness, somnolence, fatigue and ataxia. Clofazimine Counseling:  I discussed with the patient the risks of clofazimine including but not limited to skin and eye pigmentation, liver damage, nausea/vomiting, gastrointestinal bleeding and allergy. Ivermectin Counseling:  Patient instructed to take medication on an empty stomach with a full glass of water.  Patient informed of potential adverse effects including but not limited to nausea, diarrhea, dizziness, itching, and swelling of the extremities or lymph nodes.  The patient verbalized understanding of the proper use and possible adverse effects of ivermectin.  All of the patient's questions and concerns were addressed. Methotrexate Pregnancy And Lactation Text: This medication is Pregnancy Category X and is known to cause fetal harm. This medication is excreted in breast milk. Olumiant Counseling: I discussed with the patient the risks of Olumiant therapy including but not limited to upper respiratory tract infections, shingles, cold sores, and nausea. Live vaccines should be avoided.  This medication has been linked to serious infections; higher rate of mortality; malignancy and lymphoproliferative disorders; major adverse cardiovascular events; thrombosis; gastrointestinal perforations; neutropenia; lymphopenia; anemia; liver enzyme elevations; and lipid elevations. Enbrel Counseling:  I discussed with the patient the risks of etanercept including but not limited to myelosuppression, immunosuppression, autoimmune hepatitis, demyelinating diseases, lymphoma, and infections.  The patient understands that monitoring is required including a PPD at baseline and must alert us or the primary physician if symptoms of infection or other concerning signs are noted. Wegovy Counseling: I reviewed the possible side effects including: thyroid tumors, kidney disease, gallbladder disease, abdominal pain, constipation, diarrhea, nausea, vomiting and pancreatitis. Do not take this medication if you have a history or family history of multiple endocrine neoplasia syndrome type 2. Side effects reviewed, pt to contact office should one occur. Topical Ketoconazole Counseling: Patient counseled that this medication may cause skin irritation or allergic reactions.  In the event of skin irritation, the patient was advised to reduce the amount of the drug applied or use it less frequently.   The patient verbalized understanding of the proper use and possible adverse effects of ketoconazole.  All of the patient's questions and concerns were addressed. Xolair Counseling:  Patient informed of potential adverse effects including but not limited to fever, muscle aches, rash and allergic reactions.  The patient verbalized understanding of the proper use and possible adverse effects of Xolair.  All of the patient's questions and concerns were addressed. Otezla Counseling: The side effects of Otezla were discussed with the patient, including but not limited to worsening or new depression, weight loss, diarrhea, nausea, upper respiratory tract infection, and headache. Patient instructed to call the office should any adverse effect occur.  The patient verbalized understanding of the proper use and possible adverse effects of Otezla.  All the patient's questions and concerns were addressed. Cimzia Pregnancy And Lactation Text: This medication crosses the placenta but can be considered safe in certain situations. Cimzia may be excreted in breast milk. Dutasteride Male Counseling: Dustasteride Counseling:  I discussed with the patient the risks of use of dutasteride including but not limited to decreased libido, decreased ejaculate volume, and gynecomastia. Women who can become pregnant should not handle medication.  All of the patient's questions and concerns were addressed. Eucrisa Counseling: Patient may experience a mild burning sensation during topical application. Eucrisa is not approved in children less than 3 months of age. Doxycycline Counseling:  Patient counseled regarding possible photosensitivity and increased risk for sunburn.  Patient instructed to avoid sunlight, if possible.  When exposed to sunlight, patients should wear protective clothing, sunglasses, and sunscreen.  The patient was instructed to call the office immediately if the following severe adverse effects occur:  hearing changes, easy bruising/bleeding, severe headache, or vision changes.  The patient verbalized understanding of the proper use and possible adverse effects of doxycycline.  All of the patient's questions and concerns were addressed. Carac Pregnancy And Lactation Text: This medication is Pregnancy Category X and contraindicated in pregnancy and in women who may become pregnant. It is unknown if this medication is excreted in breast milk. Olumiant Pregnancy And Lactation Text: Based on animal studies, Olumiant may cause embryo-fetal harm when administered to pregnant women.  The medication should not be used in pregnancy.  Breastfeeding is not recommended during treatment. Fluconazole Pregnancy And Lactation Text: This medication is Pregnancy Category C and it isn't know if it is safe during pregnancy. It is also excreted in breast milk. Azithromycin Pregnancy And Lactation Text: This medication is considered safe during pregnancy and is also secreted in breast milk. Doxepin Pregnancy And Lactation Text: This medication is Pregnancy Category C and it isn't known if it is safe during pregnancy. It is also excreted in breast milk and breast feeding isn't recommended. Niacinamide Pregnancy And Lactation Text: These medications are considered safe during pregnancy. Simlandi Counseling:  I discussed with the patient the risks of adalimumab including but not limited to myelosuppression, immunosuppression, autoimmune hepatitis, demyelinating diseases, lymphoma, and serious infections.  The patient understands that monitoring is required including a PPD at baseline and must alert us or the primary physician if symptoms of infection or other concerning signs are noted. Sski Pregnancy And Lactation Text: This medication is Pregnancy Category D and isn't considered safe during pregnancy. It is excreted in breast milk. Protopic Pregnancy And Lactation Text: This medication is Pregnancy Category C. It is unknown if this medication is excreted in breast milk when applied topically. Detail Level: Simple Cellcept Pregnancy And Lactation Text: This medication is Pregnancy Category D and isn't considered safe during pregnancy. It is unknown if this medication is excreted in breast milk. Aklief counseling:  Patient advised to apply a pea-sized amount only at bedtime and wait 30 minutes after washing their face before applying.  If too drying, patient may add a non-comedogenic moisturizer.  The most commonly reported side effects including irritation, redness, scaling, dryness, stinging, burning, itching, and increased risk of sunburn.  The patient verbalized understanding of the proper use and possible adverse effects of retinoids.  All of the patient's questions and concerns were addressed. Soolantra Pregnancy And Lactation Text: This medication is Pregnancy Category C. This medication is considered safe during breast feeding. Cosentyx Counseling:  I discussed with the patient the risks of Cosentyx including but not limited to worsening of Crohn's disease, immunosuppression, allergic reactions and infections.  The patient understands that monitoring is required including a PPD at baseline and must alert us or the primary physician if symptoms of infection or other concerning signs are noted. Stelara Counseling:  I discussed with the patient the risks of ustekinumab including but not limited to immunosuppression, malignancy, posterior leukoencephalopathy syndrome, and serious infections.  The patient understands that monitoring is required including a PPD at baseline and must alert us or the primary physician if symptoms of infection or other concerning signs are noted. Acitretin Pregnancy And Lactation Text: This medication is Pregnancy Category X and should not be given to women who are pregnant or may become pregnant in the future. This medication is excreted in breast milk. Nsaids Counseling: NSAID Counseling: I discussed with the patient that NSAIDs should be taken with food. Prolonged use of NSAIDs can result in the development of stomach ulcers.  Patient advised to stop taking NSAIDs if abdominal pain occurs.  The patient verbalized understanding of the proper use and possible adverse effects of NSAIDs.  All of the patient's questions and concerns were addressed. Mirvaso Counseling: Mirvaso is a topical medication which can decrease superficial blood flow where applied. Side effects are uncommon and include stinging, redness and allergic reactions. Winlevi Counseling:  I discussed with the patient the risks of topical clascoterone including but not limited to erythema, scaling, itching, and stinging. Patient voiced their understanding. Birth Control Pills Pregnancy And Lactation Text: This medication should be avoided if pregnant and for the first 30 days post-partum. Ketoconazole Pregnancy And Lactation Text: This medication is Pregnancy Category C and it isn't know if it is safe during pregnancy. It is also excreted in breast milk and breast feeding isn't recommended. Thalidomide Counseling: I discussed with the patient the risks of thalidomide including but not limited to birth defects, anxiety, weakness, chest pain, dizziness, cough and severe allergy. Hydroxyzine Counseling: Patient advised that the medication is sedating and not to drive a car after taking this medication.  Patient informed of potential adverse effects including but not limited to dry mouth, urinary retention, and blurry vision.  The patient verbalized understanding of the proper use and possible adverse effects of hydroxyzine.  All of the patient's questions and concerns were addressed. Qbrexza Counseling:  I discussed with the patient the risks of Qbrexza including but not limited to headache, mydriasis, blurred vision, dry eyes, nasal dryness, dry mouth, dry throat, dry skin, urinary hesitation, and constipation.  Local skin reactions including erythema, burning, stinging, and itching can also occur. Ozempic Counseling: I reviewed the possible side effects including: thyroid tumors, kidney disease, gallbladder disease, abdominal pain, constipation, diarrhea, nausea, vomiting and pancreatitis. Do not take this medication if you have a history or family history of multiple endocrine neoplasia syndrome type 2. Side effects reviewed, pt to contact office should one occur. Bexarotene Counseling:  I discussed with the patient the risks of bexarotene including but not limited to hair loss, dry lips/skin/eyes, liver abnormalities, hyperlipidemia, pancreatitis, depression/suicidal ideation, photosensitivity, drug rash/allergic reactions, hypothyroidism, anemia, leukopenia, infection, cataracts, and teratogenicity.  Patient understands that they will need regular blood tests to check lipid profile, liver function tests, white blood cell count, thyroid function tests and pregnancy test if applicable. Aklief Pregnancy And Lactation Text: It is unknown if this medication is safe to use during pregnancy.  It is unknown if this medication is excreted in breast milk.  Breastfeeding women should use the topical cream on the smallest area of the skin for the shortest time needed while breastfeeding.  Do not apply to nipple and areola. Topical Retinoid counseling:  Patient advised to apply a pea-sized amount only at bedtime and wait 30 minutes after washing their face before applying.  If too drying, patient may add a non-comedogenic moisturizer. The patient verbalized understanding of the proper use and possible adverse effects of retinoids.  All of the patient's questions and concerns were addressed. Cyclophosphamide Counseling:  I discussed with the patient the risks of cyclophosphamide including but not limited to hair loss, hormonal abnormalities, decreased fertility, abdominal pain, diarrhea, nausea and vomiting, bone marrow suppression and infection. The patient understands that monitoring is required while taking this medication. Adbry Counseling: I discussed with the patient the risks of tralokinumab including but not limited to eye infection and irritation, cold sores, injection site reactions, worsening of asthma, allergic reactions and increased risk of parasitic infection.  Live vaccines should be avoided while taking tralokinumab. The patient understands that monitoring is required and they must alert us or the primary physician if symptoms of infection or other concerning signs are noted. Nsaids Pregnancy And Lactation Text: These medications are considered safe up to 30 weeks gestation. It is excreted in breast milk. Libtayo Counseling- I discussed with the patient the risks of Libtayo including but not limited to nausea, vomiting, diarrhea, and bone or muscle pain.  The patient verbalized understanding of the proper use and possible adverse effects of Libtayo.  All of the patient's questions and concerns were addressed. Include Pregnancy/Lactation Warning?: No Winlevi Pregnancy And Lactation Text: This medication is considered safe during pregnancy and breastfeeding. Spironolactone Counseling: Patient advised regarding risks of diarrhea, abdominal pain, hyperkalemia, birth defects (for female patients), liver toxicity and renal toxicity. The patient may need blood work to monitor liver and kidney function and potassium levels while on therapy. The patient verbalized understanding of the proper use and possible adverse effects of spironolactone.  All of the patient's questions and concerns were addressed. Qbrexza Pregnancy And Lactation Text: There is no available data on Qbrexza use in pregnant women.  There is no available data on Qbrexza use in lactation. Mirvaso Pregnancy And Lactation Text: This medication has not been assigned a Pregnancy Risk Category. It is unknown if the medication is excreted in breast milk. Quinolones Counseling:  I discussed with the patient the risks of fluoroquinolones including but not limited to GI upset, allergic reaction, drug rash, diarrhea, dizziness, photosensitivity, yeast infections, liver function test abnormalities, tendonitis/tendon rupture. Terbinafine Counseling: Patient counseling regarding adverse effects of terbinafine including but not limited to headache, diarrhea, rash, upset stomach, liver function test abnormalities, itching, taste/smell disturbance, nausea, abdominal pain, and flatulence.  There is a rare possibility of liver failure that can occur when taking terbinafine.  The patient understands that a baseline LFT and kidney function test may be required. The patient verbalized understanding of the proper use and possible adverse effects of terbinafine.  All of the patient's questions and concerns were addressed. Bexarotene Pregnancy And Lactation Text: This medication is Pregnancy Category X and should not be given to women who are pregnant or may become pregnant. This medication should not be used if you are breast feeding. Azelaic Acid Counseling: Patient counseled that medicine may cause skin irritation and to avoid applying near the eyes.  In the event of skin irritation, the patient was advised to reduce the amount of the drug applied or use it less frequently.   The patient verbalized understanding of the proper use and possible adverse effects of azelaic acid.  All of the patient's questions and concerns were addressed. VTAMA Counseling: I discussed with the patient that VTAMA is not for use in the eyes, mouth or mouth. They should call the office if they develop any signs of allergic reactions to VTAMA. The patient verbalized understanding of the proper use and possible adverse effects of VTAMA.  All of the patient's questions and concerns were addressed. Cyclophosphamide Pregnancy And Lactation Text: This medication is Pregnancy Category D and it isn't considered safe during pregnancy. This medication is excreted in breast milk. Dupixent Counseling: I discussed with the patient the risks of dupilumab including but not limited to eye inflammation and irritation, cold sores, injection site reactions, allergic reactions and increased risk of parasitic infection. The patient understands that monitoring is required and they must alert us or the primary physician if symptoms of infection or other concerning signs are noted. Opzelura Counseling:  I discussed with the patient the risks of Opzelura including but not limited to nasopharngitis, bronchitis, ear infection, eosinophila, hives, diarrhea, folliculitis, tonsillitis, and rhinorrhea.  Taken orally, this medication has been linked to serious infections; higher rate of mortality; malignancy and lymphoproliferative disorders; major adverse cardiovascular events; thrombosis; thrombocytopenia, anemia, and neutropenia; and lipid elevations. Taltz Counseling: I discussed with the patient the risks of ixekizumab including but not limited to immunosuppression, serious infections, worsening of inflammatory bowel disease and drug reactions.  The patient understands that monitoring is required including a PPD at baseline and must alert us or the primary physician if symptoms of infection or other concerning signs are noted. Libtayo Pregnancy And Lactation Text: This medication is contraindicated in pregnancy and when breast feeding. Cibinqo Counseling: I discussed with the patient the risks of Cibinqo therapy including but not limited to common cold, nausea, headache, cold sores, increased blood CPK levels, dizziness, UTIs, fatigue, acne, and vomitting. Live vaccines should be avoided.  This medication has been linked to serious infections; higher rate of mortality; malignancy and lymphoproliferative disorders; major adverse cardiovascular events; thrombosis; thrombocytopenia and lymphopenia; lipid elevations; and retinal detachment. Cantharidin Pregnancy And Lactation Text: This medication has not been proven safe during pregnancy. It is unknown if this medication is excreted in breast milk. Fluconazole Counseling:  Patient counseled regarding adverse effects of fluconazole including but not limited to headache, diarrhea, nausea, upset stomach, liver function test abnormalities, taste disturbance, and stomach pain.  There is a rare possibility of liver failure that can occur when taking fluconazole.  The patient understands that monitoring of LFTs and kidney function test may be required, especially at baseline. The patient verbalized understanding of the proper use and possible adverse effects of fluconazole.  All of the patient's questions and concerns were addressed. Spironolactone Pregnancy And Lactation Text: This medication can cause feminization of the male fetus and should be avoided during pregnancy. The active metabolite is also found in breast milk. Rhofade Counseling: Rhofade is a topical medication which can decrease superficial blood flow where applied. Side effects are uncommon and include stinging, redness and allergic reactions. Colchicine Counseling:  Patient counseled regarding adverse effects including but not limited to stomach upset (nausea, vomiting, stomach pain, or diarrhea).  Patient instructed to limit alcohol consumption while taking this medication.  Colchicine may reduce blood counts especially with prolonged use.  The patient understands that monitoring of kidney function and blood counts may be required, especially at baseline. The patient verbalized understanding of the proper use and possible adverse effects of colchicine.  All of the patient's questions and concerns were addressed. Drysol Counseling:  I discussed with the patient the risks of drysol/aluminum chloride including but not limited to skin rash, itching, irritation, burning. Cephalexin Counseling: I counseled the patient regarding use of cephalexin as an antibiotic for prophylactic and/or therapeutic purposes. Cephalexin (commonly prescribed under brand name Keflex) is a cephalosporin antibiotic which is active against numerous classes of bacteria, including most skin bacteria. Side effects may include nausea, diarrhea, gastrointestinal upset, rash, hives, yeast infections, and in rare cases, hepatitis, kidney disease, seizures, fever, confusion, neurologic symptoms, and others. Patients with severe allergies to penicillin medications are cautioned that there is about a 10% incidence of cross-reactivity with cephalosporins. When possible, patients with penicillin allergies should use alternatives to cephalosporins for antibiotic therapy. Olanzapine Counseling- I discussed with the patient the common side effects of olanzapine including but are not limited to: lack of energy, dry mouth, increased appetite, sleepiness, tremor, constipation, dizziness, changes in behavior, or restlessness.  Explained that teenagers are more likely to experience headaches, abdominal pain, pain in the arms or legs, tiredness, and sleepiness.  Serious side effects include but are not limited: increased risk of death in elderly patients who are confused, have memory loss, or dementia-related psychosis; hyperglycemia; increased cholesterol and triglycerides; and weight gain. Terbinafine Pregnancy And Lactation Text: This medication is Pregnancy Category B and is considered safe during pregnancy. It is also excreted in breast milk and breast feeding isn't recommended. Tranexamic Acid Counseling:  Patient advised of the small risk of bleeding problems with tranexamic acid. They were also instructed to call if they developed any nausea, vomiting or diarrhea. All of the patient's questions and concerns were addressed. Tazorac Counseling:  Patient advised that medication is irritating and drying.  Patient may need to apply sparingly and wash off after an hour before eventually leaving it on overnight.  The patient verbalized understanding of the proper use and possible adverse effects of tazorac.  All of the patient's questions and concerns were addressed. Hyrimoz Counseling:  I discussed with the patient the risks of adalimumab including but not limited to myelosuppression, immunosuppression, autoimmune hepatitis, demyelinating diseases, lymphoma, and serious infections.  The patient understands that monitoring is required including a PPD at baseline and must alert us or the primary physician if symptoms of infection or other concerning signs are noted. Saxenda Counseling: I reviewed the possible side effects including: thyroid tumors, kidney disease, gallbladder disease, abdominal pain, constipation, diarrhea, nausea, vomiting and pancreatitis. Do not take this medication if you have a history or family history of multiple endocrine neoplasia syndrome type 2. Side effects reviewed, pt to contact office should one occur. Cibinqo Pregnancy And Lactation Text: It is unknown if this medication will adversely affect pregnancy or breast feeding.  You should not take this medication if you are currently pregnant or planning a pregnancy or while breastfeeding. Cyclosporine Counseling:  I discussed with the patient the risks of cyclosporine including but not limited to hypertension, gingival hyperplasia,myelosuppression, immunosuppression, liver damage, kidney damage, neurotoxicity, lymphoma, and serious infections. The patient understands that monitoring is required including baseline blood pressure, CBC, CMP, lipid panel and uric acid, and then 1-2 times monthly CMP and blood pressure. Topical Steroids Applications Pregnancy And Lactation Text: Most topical steroids are considered safe to use during pregnancy and lactation.  Any topical steroid applied to the breast or nipple should be washed off before breastfeeding. Rituxan Counseling:  I discussed with the patient the risks of Rituxan infusions. Side effects can include infusion reactions, severe drug rashes including mucocutaneous reactions, reactivation of latent hepatitis and other infections and rarely progressive multifocal leukoencephalopathy.  All of the patient's questions and concerns were addressed. Hydroxychloroquine Pregnancy And Lactation Text: This medication has been shown to cause fetal harm but it isn't assigned a Pregnancy Risk Category. There are small amounts excreted in breast milk. Vtama Pregnancy And Lactation Text: It is unknown if this medication can cause problems during pregnancy and breastfeeding. Odomzo Counseling- I discussed with the patient the risks of Odomzo including but not limited to nausea, vomiting, diarrhea, constipation, weight loss, changes in the sense of taste, decreased appetite, muscle spasms, and hair loss.  The patient verbalized understanding of the proper use and possible adverse effects of Odomzo.  All of the patient's questions and concerns were addressed. Isotretinoin Counseling: Patient should get monthly blood tests, not donate blood, not drive at night if vision affected, not share medication, and not undergo elective surgery for 6 months after tx completed. Side effects reviewed, pt to contact office should one occur. Cimetidine Counseling:  I discussed with the patient the risks of Cimetidine including but not limited to gynecomastia, headache, diarrhea, nausea, drowsiness, arrhythmias, pancreatitis, skin rashes, psychosis, bone marrow suppression and kidney toxicity. Tranexamic Acid Pregnancy And Lactation Text: It is unknown if this medication is safe during pregnancy or breast feeding. Metronidazole Pregnancy And Lactation Text: This medication is Pregnancy Category B and considered safe during pregnancy.  It is also excreted in breast milk. Skyrizi Counseling: I discussed with the patient the risks of risankizumab-rzaa including but not limited to immunosuppression, and serious infections.  The patient understands that monitoring is required including a PPD at baseline and must alert us or the primary physician if symptoms of infection or other concerning signs are noted. Finasteride Pregnancy And Lactation Text: This medication is absolutely contraindicated during pregnancy. It is unknown if it is excreted in breast milk. Rituxan Pregnancy And Lactation Text: This medication is Pregnancy Category C and it isn't know if it is safe during pregnancy. It is unknown if this medication is excreted in breast milk but similar antibodies are known to be excreted. Topical Sulfur Applications Counseling: Topical Sulfur Counseling: Patient counseled that this medication may cause skin irritation or allergic reactions.  In the event of skin irritation, the patient was advised to reduce the amount of the drug applied or use it less frequently.   The patient verbalized understanding of the proper use and possible adverse effects of topical sulfur application.  All of the patient's questions and concerns were addressed. Low Dose Naltrexone Counseling- I discussed with the patient the potential risks and side effects of low dose naltrexone including but not limited to: more vivid dreams, headaches, nausea, vomiting, abdominal pain, fatigue, dizziness, and anxiety. Klisyri Counseling:  I discussed with the patient the risks of Klisyri including but not limited to erythema, scaling, itching, weeping, crusting, and pain. Ketoconazole Counseling:   Patient counseled regarding improving absorption with orange juice.  Adverse effects include but are not limited to breast enlargement, headache, diarrhea, nausea, upset stomach, liver function test abnormalities, taste disturbance, and stomach pain.  There is a rare possibility of liver failure that can occur when taking ketoconazole. The patient understands that monitoring of LFTs may be required, especially at baseline. The patient verbalized understanding of the proper use and possible adverse effects of ketoconazole.  All of the patient's questions and concerns were addressed. Zoryve Counseling:  I discussed with the patient that Zoryve is not for use in the eyes, mouth or vagina. The most commonly reported side effects include diarrhea, headache, insomnia, application site pain, upper respiratory tract infections, and urinary tract infections.  All of the patient's questions and concerns were addressed. Azathioprine Counseling:  I discussed with the patient the risks of azathioprine including but not limited to myelosuppression, immunosuppression, hepatotoxicity, lymphoma, and infections.  The patient understands that monitoring is required including baseline LFTs, Creatinine, possible TPMP genotyping and weekly CBCs for the first month and then every 2 weeks thereafter.  The patient verbalized understanding of the proper use and possible adverse effects of azathioprine.  All of the patient's questions and concerns were addressed. Picato Counseling:  I discussed with the patient the risks of Picato including but not limited to erythema, scaling, itching, weeping, crusting, and pain. Solaraze Counseling:  I discussed with the patient the risks of Solaraze including but not limited to erythema, scaling, itching, weeping, crusting, and pain. Valtrex Counseling: I discussed with the patient the risks of valacyclovir including but not limited to kidney damage, nausea, vomiting and severe allergy.  The patient understands that if the infection seems to be worsening or is not improving, they are to call. Isotretinoin Pregnancy And Lactation Text: This medication is Pregnancy Category X and is considered extremely dangerous during pregnancy. It is unknown if it is excreted in breast milk. Birth Control Pills Counseling: Birth Control Pill Counseling: I discussed with the patient the potential side effects of OCPs including but not limited to increased risk of stroke, heart attack, thrombophlebitis, deep venous thrombosis, hepatic adenomas, breast changes, GI upset, headaches, and depression.  The patient verbalized understanding of the proper use and possible adverse effects of OCPs. All of the patient's questions and concerns were addressed. Topical Sulfur Applications Pregnancy And Lactation Text: This medication is considered safe during pregnancy and breast feeding secondary to limited systemic absorption. Minocycline Counseling: Patient advised regarding possible photosensitivity and discoloration of the teeth, skin, lips, tongue and gums.  Patient instructed to avoid sunlight, if possible.  When exposed to sunlight, patients should wear protective clothing, sunglasses, and sunscreen.  The patient was instructed to call the office immediately if the following severe adverse effects occur:  hearing changes, easy bruising/bleeding, severe headache, or vision changes.  The patient verbalized understanding of the proper use and possible adverse effects of minocycline.  All of the patient's questions and concerns were addressed. Klisyri Pregnancy And Lactation Text: It is unknown if this medication can harm a developing fetus or if it is excreted in breast milk. Siliq Counseling:  I discussed with the patient the risks of Siliq including but not limited to new or worsening depression, suicidal thoughts and behavior, immunosuppression, malignancy, posterior leukoencephalopathy syndrome, and serious infections.  The patient understands that monitoring is required including a PPD at baseline and must alert us or the primary physician if symptoms of infection or other concerning signs are noted. There is also a special program designed to monitor depression which is required with Siliq. Propranolol Pregnancy And Lactation Text: This medication is Pregnancy Category C and it isn't known if it is safe during pregnancy. It is excreted in breast milk. Low Dose Naltrexone Pregnancy And Lactation Text: Naltrexone is pregnancy category C.  There have been no adequate and well-controlled studies in pregnant women.  It should be used in pregnancy only if the potential benefit justifies the potential risk to the fetus.   Limited data indicates that naltrexone is minimally excreted into breastmilk. Spevigo Counseling: I discussed with the patient the risks of Spevigo including but not limited to fatigue, nasuea, vomiting, headache, pruritus, urinary tract infection, an infusion related reactions.  The patient understands that monitoring is required including screening for tuberculosis at baseline and yearly screening thereafter while continuing Spevigo therapy. They should contact us if symptoms of infection or other concerning signs are noted. Azithromycin Counseling:  I discussed with the patient the risks of azithromycin including but not limited to GI upset, allergic reaction, drug rash, diarrhea, and yeast infections. Solaraze Pregnancy And Lactation Text: This medication is Pregnancy Category B and is considered safe. There is some data to suggest avoiding during the third trimester. It is unknown if this medication is excreted in breast milk. High Dose Vitamin A Counseling: Side effects reviewed, pt to contact office should one occur. Valtrex Pregnancy And Lactation Text: this medication is Pregnancy Category B and is considered safe during pregnancy. This medication is not directly found in breast milk but it's metabolite acyclovir is present. Bimzelx Counseling:  I discussed with the patient the risks of Bimzelx including but not limited to depression, immunosuppression, allergic reactions and infections.  The patient understands that monitoring is required including a PPD at baseline and must alert us or the primary physician if symptoms of infection or other concerning signs are noted. Niacinamide Counseling: I recommended taking niacin or niacinamide, also know as vitamin B3, twice daily. Recent evidence suggests that taking vitamin B3 (500 mg twice daily) can reduce the risk of actinic keratoses and non-melanoma skin cancers. Side effects of vitamin B3 include flushing and headache. Doxepin Counseling:  Patient advised that the medication is sedating and not to drive a car after taking this medication. Patient informed of potential adverse effects including but not limited to dry mouth, urinary retention, and blurry vision.  The patient verbalized understanding of the proper use and possible adverse effects of doxepin.  All of the patient's questions and concerns were addressed. Zyclara Counseling:  I discussed with the patient the risks of imiquimod including but not limited to erythema, scaling, itching, weeping, crusting, and pain.  Patient understands that the inflammatory response to imiquimod is variable from person to person and was educated regarded proper titration schedule.  If flu-like symptoms develop, patient knows to discontinue the medication and contact us. Wartpeel Counseling:  I discussed with the patient the risks of Wartpeel including but not limited to erythema, scaling, itching, weeping, crusting, and pain. Minoxidil Counseling: Minoxidil is a topical medication which can increase blood flow where it is applied. It is uncertain how this medication increases hair growth. Side effects are uncommon and include stinging and allergic reactions. SSKI Counseling:  I discussed with the patient the risks of SSKI including but not limited to thyroid abnormalities, metallic taste, GI upset, fever, headache, acne, arthralgias, paraesthesias, lymphadenopathy, easy bleeding, arrhythmias, and allergic reaction. Protopic Counseling: Patient may experience a mild burning sensation during topical application. Protopic is not approved in children less than 2 years of age. There have been case reports of hematologic and skin malignancies in patients using topical calcineurin inhibitors although causality is questionable. Soolantra Counseling: I discussed with the patients the risks of topial Soolantra. This is a medicine which decreases the number of mites and inflammation in the skin. You experience burning, stinging, eye irritation or allergic reactions.  Please call our office if you develop any problems from using this medication. Cellcept Counseling:  I discussed with the patient the risks of mycophenolate mofetil including but not limited to infection/immunosuppression, GI upset, hypokalemia, hypercholesterolemia, bone marrow suppression, lymphoproliferative disorders, malignancy, GI ulceration/bleed/perforation, colitis, interstitial lung disease, kidney failure, progressive multifocal leukoencephalopathy, and birth defects.  The patient understands that monitoring is required including a baseline creatinine and regular CBC testing. In addition, patient must alert us immediately if symptoms of infection or other concerning signs are noted. High Dose Vitamin A Pregnancy And Lactation Text: High dose vitamin A therapy is contraindicated during pregnancy and breast feeding. Spevigo Pregnancy And Lactation Text: The risk during pregnancy and breastfeeding is uncertain with this medication. This medication does cross the placenta. It is unknown if this medication is found in breast milk. Erivedge Counseling- I discussed with the patient the risks of Erivedge including but not limited to nausea, vomiting, diarrhea, constipation, weight loss, changes in the sense of taste, decreased appetite, muscle spasms, and hair loss.  The patient verbalized understanding of the proper use and possible adverse effects of Erivedge.  All of the patient's questions and concerns were addressed. Acitretin Counseling:  I discussed with the patient the risks of acitretin including but not limited to hair loss, dry lips/skin/eyes, liver damage, hyperlipidemia, depression/suicidal ideation, photosensitivity.  Serious rare side effects can include but are not limited to pancreatitis, pseudotumor cerebri, bony changes, clot formation/stroke/heart attack.  Patient understands that alcohol is contraindicated since it can result in liver toxicity and significantly prolong the elimination of the drug by many years.

## 2025-04-28 ENCOUNTER — OFFICE VISIT (OUTPATIENT)
Age: 65
End: 2025-04-28
Payer: MEDICARE

## 2025-04-28 VITALS
BODY MASS INDEX: 35.51 KG/M2 | WEIGHT: 208 LBS | HEIGHT: 64 IN | HEART RATE: 70 BPM | RESPIRATION RATE: 16 BRPM | TEMPERATURE: 98 F | SYSTOLIC BLOOD PRESSURE: 110 MMHG | DIASTOLIC BLOOD PRESSURE: 71 MMHG | OXYGEN SATURATION: 98 %

## 2025-04-28 DIAGNOSIS — I73.9 PAD (PERIPHERAL ARTERY DISEASE): Primary | ICD-10-CM

## 2025-04-28 PROCEDURE — G8427 DOCREV CUR MEDS BY ELIG CLIN: HCPCS | Performed by: SURGERY

## 2025-04-28 PROCEDURE — 4004F PT TOBACCO SCREEN RCVD TLK: CPT | Performed by: SURGERY

## 2025-04-28 PROCEDURE — 1123F ACP DISCUSS/DSCN MKR DOCD: CPT | Performed by: SURGERY

## 2025-04-28 PROCEDURE — 1090F PRES/ABSN URINE INCON ASSESS: CPT | Performed by: SURGERY

## 2025-04-28 PROCEDURE — G8417 CALC BMI ABV UP PARAM F/U: HCPCS | Performed by: SURGERY

## 2025-04-28 PROCEDURE — 99212 OFFICE O/P EST SF 10 MIN: CPT | Performed by: SURGERY

## 2025-04-28 PROCEDURE — G8400 PT W/DXA NO RESULTS DOC: HCPCS | Performed by: SURGERY

## 2025-04-28 PROCEDURE — 3017F COLORECTAL CA SCREEN DOC REV: CPT | Performed by: SURGERY

## 2025-04-28 ASSESSMENT — PATIENT HEALTH QUESTIONNAIRE - PHQ9
SUM OF ALL RESPONSES TO PHQ QUESTIONS 1-9: 0
SUM OF ALL RESPONSES TO PHQ QUESTIONS 1-9: 0
2. FEELING DOWN, DEPRESSED OR HOPELESS: NOT AT ALL
SUM OF ALL RESPONSES TO PHQ QUESTIONS 1-9: 0
1. LITTLE INTEREST OR PLEASURE IN DOING THINGS: NOT AT ALL
SUM OF ALL RESPONSES TO PHQ QUESTIONS 1-9: 0

## 2025-04-28 NOTE — PROGRESS NOTES
Identified pt with two pt identifiers (name and ). Reviewed chart in preparation for visit and have obtained necessary documentation.    Juliet Tatum is a 65 y.o. female  Chief Complaint   Patient presents with    Follow-up     PAD     /71 (BP Site: Left Upper Arm, Patient Position: Sitting, BP Cuff Size: Large Adult)   Pulse 70   Temp 98 °F (36.7 °C) (Oral)   Resp 16   Ht 1.626 m (5' 4\")   Wt 94.3 kg (208 lb)   SpO2 98%   BMI 35.70 kg/m²     1. Have you been to the ER, urgent care clinic since your last visit?  Hospitalized since your last visit?Yes    2. Have you seen or consulted any other health care providers outside of the [facility] since your last visit?  Include any pap smears or colon screening. yes

## 2025-05-02 NOTE — PROGRESS NOTES
Vascular History and Physical    Patient: Juliet Tatum  MRN: [id number]    YOB: 1960  Age: 65 y.o.  Sex: female     Chief Complaint:  Chief Complaint   Patient presents with    Follow-up     PAD       History of Present Illness: Juliet Tatum is a 65 y.o. very pleasant gentleman is here today for 6-month follow-up.  Patient had right leg angioplasty last year.  Patient currently doing well.  Denies any worsening pain in both leg.  Patient is pretty active.  Denies any particular worsening claudication symptoms.  Denies any chest pain shortness breath.  Denies recent MI or stroke.  Denies abdominal pain or weight loss.    Social History:  Social Connections: Not on file       Past Medical History:  Past Medical History:   Diagnosis Date    Arthritis     Family history of ischemic heart disease     Prolonged emergence from general anesthesia     PVD (peripheral vascular disease)     Smoker     Swelling     ankles, hands    Thromboembolus (HCC)     left leg    Trigeminal neuralgia        Surgical History:  Past Surgical History:   Procedure Laterality Date    COLONOSCOPY      GASTRIC BYPASS SURGERY  1991    HYSTERECTOMY (CERVIX STATUS UNKNOWN)  2001    INVASIVE VASCULAR N/A 8/13/2024    Angiography lower ext right performed by Chandana Lindquist MD at [facility] ELECTROPHYSIOLOGY    INVASIVE VASCULAR N/A 8/13/2024    Aortagram abdominal performed by Chandana Lindquist MD at [facility] ELECTROPHYSIOLOGY    INVASIVE VASCULAR N/A 8/13/2024    Ultrasound guided vascular access performed by Chandana Lindquist MD at [facility] ELECTROPHYSIOLOGY    INVASIVE VASCULAR N/A 8/13/2024    Angioplasty superficial femoral artery performed by Chandana Lindquist MD at [facility] ELECTROPHYSIOLOGY    INVASIVE VASCULAR N/A 8/13/2024    Atherectomy  femoral popliteal performed by Chandana Lindquist MD at [facility] ELECTROPHYSIOLOGY    OTHER SURGICAL HISTORY Bilateral     angiogram with intervention       Allergies:  Allergies   Allergen Reactions    Tramadol Hives

## 2025-05-08 RX ORDER — CLOPIDOGREL BISULFATE 75 MG/1
75 TABLET ORAL DAILY
Qty: 90 TABLET | Refills: 1 | Status: SHIPPED | OUTPATIENT
Start: 2025-05-08

## 2025-08-11 ENCOUNTER — TELEPHONE (OUTPATIENT)
Age: 65
End: 2025-08-11

## 2025-08-11 RX ORDER — CLOPIDOGREL BISULFATE 75 MG/1
75 TABLET ORAL DAILY
Qty: 90 TABLET | Refills: 1 | Status: SHIPPED | OUTPATIENT
Start: 2025-08-11

## (undated) DEVICE — GLOW 'N TELL 55CM TAPE (20 STRIPS): Brand: VASCUTAPE RADIOPAQUE TAPE

## (undated) DEVICE — NAVICROSS SUPPORT CATHETER: Brand: NAVICROSS

## (undated) DEVICE — DEVICE EMBOLIC PROTCT FLTR L5MM GWIRE L320/190CM DIA0.014IN

## (undated) DEVICE — CATHETER MARINER BRAID RIM 5FX65 CM .035 IN.DIAGNOSTIC

## (undated) DEVICE — DEVICE INFL SYR BLLN ENDO 30 -- INTELLI

## (undated) DEVICE — MICROPUNCTURE INTRODUCER SET SILHOUETTE TRANSITIONLESS PUSH-PLUS DESIGN - STIFFENED CANNULA WITH STAINLESS STEEL WIRE GUIDE: Brand: MICROPUNCTURE

## (undated) DEVICE — RADIFOCUS GLIDEWIRE: Brand: GLIDEWIRE

## (undated) DEVICE — CATHETER ATHRCTMY 6FR L135CM TIP L5.9CM GWIRE 0.014IN

## (undated) DEVICE — PINNACLE INTRODUCER SHEATH: Brand: PINNACLE

## (undated) DEVICE — GAUZE,SPONGE,4"X4",16PLY,STRL,LF,10/TRAY: Brand: MEDLINE

## (undated) DEVICE — DEVICE INFL 20ML BRL POLYCARB ANALG LUMINESCENT DISPLAY ANG

## (undated) DEVICE — 3M™ TEGADERM™ TRANSPARENT FILM DRESSING FIRST AID STYLE, 1621, 4 IN X 4-3/4 IN, 50 BAGS/CARTON, 4 CARTONS/CASE: Brand: 3M™ TEGADERM™

## (undated) DEVICE — Device

## (undated) DEVICE — SUTURE VICRYL + SZ 3-0 L27IN ABSRB UD L26MM SH 1/2 CIR VCP416H

## (undated) DEVICE — SURGICAL PROCEDURE TRAY CRD CATH 3 PRT

## (undated) DEVICE — CATHETER PTCA L130CM BLLN L150MM DIA5MM SHTH 6FR DRUG COAT

## (undated) DEVICE — CATHETER MARINER BRD RIM 5F X 65 CM .035 IN DIAG CATHETERS --

## (undated) DEVICE — COVER PRB INTOP 96X6 IN LF

## (undated) DEVICE — EP SPD2-US-060-320 SPIDER FX V04
Type: IMPLANTABLE DEVICE | Status: NON-FUNCTIONAL
Brand: SPIDERFX™
Removed: 2023-03-07

## (undated) DEVICE — TOWEL,OR,DSP,ST,BLUE,DLX,6/PK,12PK/CS: Brand: MEDLINE

## (undated) DEVICE — 96"PROBE COVER (US)10PK: Brand: SITE-RITE

## (undated) DEVICE — ARMADA 35 PTA CATHETER 6 MM X 40 MM X 135 CM / OVER-THE-WIRE: Brand: ARMADA

## (undated) DEVICE — DESTINATION PERIPHERAL GUIDING SHEATH: Brand: DESTINATION

## (undated) DEVICE — GUIDEWIRE VASC L80CM OD0.018IN TIP L2CM NIT COR TUNGSTEN